# Patient Record
Sex: MALE | Race: WHITE | Employment: FULL TIME | URBAN - METROPOLITAN AREA
[De-identification: names, ages, dates, MRNs, and addresses within clinical notes are randomized per-mention and may not be internally consistent; named-entity substitution may affect disease eponyms.]

---

## 2020-07-30 ENCOUNTER — APPOINTMENT (OUTPATIENT)
Dept: CT IMAGING | Age: 45
End: 2020-07-30
Payer: COMMERCIAL

## 2020-07-30 ENCOUNTER — HOSPITAL ENCOUNTER (INPATIENT)
Age: 45
LOS: 5 days | Discharge: HOME | DRG: 054 | End: 2020-08-04
Attending: INTERNAL MEDICINE | Admitting: INTERNAL MEDICINE
Payer: COMMERCIAL

## 2020-07-30 ENCOUNTER — HOSPITAL ENCOUNTER (EMERGENCY)
Age: 45
Discharge: ANOTHER HOSPITAL | End: 2020-07-30
Attending: EMERGENCY MEDICINE
Payer: COMMERCIAL

## 2020-07-30 VITALS
TEMPERATURE: 98.3 F | BODY MASS INDEX: 28.6 KG/M2 | HEART RATE: 52 BPM | SYSTOLIC BLOOD PRESSURE: 145 MMHG | WEIGHT: 230 LBS | DIASTOLIC BLOOD PRESSURE: 92 MMHG | OXYGEN SATURATION: 96 % | RESPIRATION RATE: 19 BRPM | HEIGHT: 75 IN

## 2020-07-30 PROBLEM — G93.89 BRAIN MASS: Status: ACTIVE | Noted: 2020-07-30

## 2020-07-30 LAB
A/G RATIO: 1.6 (ref 1.1–2.2)
ALBUMIN SERPL-MCNC: 4.6 G/DL (ref 3.4–5)
ALP BLD-CCNC: 53 U/L (ref 40–129)
ALT SERPL-CCNC: 32 U/L (ref 10–40)
ANION GAP SERPL CALCULATED.3IONS-SCNC: 11 MMOL/L (ref 3–16)
APTT: 39.8 SEC (ref 24.2–36.2)
AST SERPL-CCNC: 24 U/L (ref 15–37)
BASOPHILS ABSOLUTE: 0.1 K/UL (ref 0–0.2)
BASOPHILS RELATIVE PERCENT: 0.9 %
BILIRUB SERPL-MCNC: 0.6 MG/DL (ref 0–1)
BUN BLDV-MCNC: 15 MG/DL (ref 7–20)
CALCIUM SERPL-MCNC: 9.8 MG/DL (ref 8.3–10.6)
CHLORIDE BLD-SCNC: 103 MMOL/L (ref 99–110)
CO2: 26 MMOL/L (ref 21–32)
CREAT SERPL-MCNC: 1.1 MG/DL (ref 0.9–1.3)
EKG ATRIAL RATE: 66 BPM
EKG DIAGNOSIS: NORMAL
EKG P AXIS: 26 DEGREES
EKG P-R INTERVAL: 138 MS
EKG Q-T INTERVAL: 422 MS
EKG QRS DURATION: 88 MS
EKG QTC CALCULATION (BAZETT): 442 MS
EKG R AXIS: 26 DEGREES
EKG T AXIS: 54 DEGREES
EKG VENTRICULAR RATE: 66 BPM
EOSINOPHILS ABSOLUTE: 0.2 K/UL (ref 0–0.6)
EOSINOPHILS RELATIVE PERCENT: 3.1 %
GFR AFRICAN AMERICAN: >60
GFR NON-AFRICAN AMERICAN: >60
GLOBULIN: 2.8 G/DL
GLUCOSE BLD-MCNC: 106 MG/DL (ref 70–99)
GLUCOSE BLD-MCNC: 110 MG/DL
GLUCOSE BLD-MCNC: 110 MG/DL (ref 70–99)
HCT VFR BLD CALC: 50.5 % (ref 40.5–52.5)
HEMOGLOBIN: 17.3 G/DL (ref 13.5–17.5)
INR BLD: 1.56 (ref 0.86–1.14)
LYMPHOCYTES ABSOLUTE: 1.8 K/UL (ref 1–5.1)
LYMPHOCYTES RELATIVE PERCENT: 26 %
MCH RBC QN AUTO: 30.1 PG (ref 26–34)
MCHC RBC AUTO-ENTMCNC: 34.3 G/DL (ref 31–36)
MCV RBC AUTO: 87.6 FL (ref 80–100)
MONOCYTES ABSOLUTE: 0.5 K/UL (ref 0–1.3)
MONOCYTES RELATIVE PERCENT: 6.5 %
NEUTROPHILS ABSOLUTE: 4.4 K/UL (ref 1.7–7.7)
NEUTROPHILS RELATIVE PERCENT: 63.5 %
PDW BLD-RTO: 12.2 % (ref 12.4–15.4)
PERFORMED ON: ABNORMAL
PLATELET # BLD: 216 K/UL (ref 135–450)
PMV BLD AUTO: 8.7 FL (ref 5–10.5)
POTASSIUM REFLEX MAGNESIUM: 4.2 MMOL/L (ref 3.5–5.1)
PRO-BNP: 151 PG/ML (ref 0–124)
PROTHROMBIN TIME: 18.2 SEC (ref 10–13.2)
RBC # BLD: 5.77 M/UL (ref 4.2–5.9)
SODIUM BLD-SCNC: 140 MMOL/L (ref 136–145)
TOTAL PROTEIN: 7.4 G/DL (ref 6.4–8.2)
TROPONIN: <0.01 NG/ML
TROPONIN: <0.01 NG/ML
TSH REFLEX: 2.75 UIU/ML (ref 0.27–4.2)
WBC # BLD: 6.9 K/UL (ref 4–11)

## 2020-07-30 PROCEDURE — 2060000000 HC ICU INTERMEDIATE R&B

## 2020-07-30 PROCEDURE — 84443 ASSAY THYROID STIM HORMONE: CPT

## 2020-07-30 PROCEDURE — 85025 COMPLETE CBC W/AUTO DIFF WBC: CPT

## 2020-07-30 PROCEDURE — 93010 ELECTROCARDIOGRAM REPORT: CPT | Performed by: INTERNAL MEDICINE

## 2020-07-30 PROCEDURE — U0003 INFECTIOUS AGENT DETECTION BY NUCLEIC ACID (DNA OR RNA); SEVERE ACUTE RESPIRATORY SYNDROME CORONAVIRUS 2 (SARS-COV-2) (CORONAVIRUS DISEASE [COVID-19]), AMPLIFIED PROBE TECHNIQUE, MAKING USE OF HIGH THROUGHPUT TECHNOLOGIES AS DESCRIBED BY CMS-2020-01-R: HCPCS

## 2020-07-30 PROCEDURE — 85610 PROTHROMBIN TIME: CPT

## 2020-07-30 PROCEDURE — 70450 CT HEAD/BRAIN W/O DYE: CPT

## 2020-07-30 PROCEDURE — 6360000004 HC RX CONTRAST MEDICATION: Performed by: EMERGENCY MEDICINE

## 2020-07-30 PROCEDURE — 93005 ELECTROCARDIOGRAM TRACING: CPT | Performed by: EMERGENCY MEDICINE

## 2020-07-30 PROCEDURE — 84484 ASSAY OF TROPONIN QUANT: CPT

## 2020-07-30 PROCEDURE — 85730 THROMBOPLASTIN TIME PARTIAL: CPT

## 2020-07-30 PROCEDURE — 80053 COMPREHEN METABOLIC PANEL: CPT

## 2020-07-30 PROCEDURE — 99285 EMERGENCY DEPT VISIT HI MDM: CPT

## 2020-07-30 PROCEDURE — 70498 CT ANGIOGRAPHY NECK: CPT

## 2020-07-30 PROCEDURE — 83880 ASSAY OF NATRIURETIC PEPTIDE: CPT

## 2020-07-30 PROCEDURE — 2580000003 HC RX 258: Performed by: EMERGENCY MEDICINE

## 2020-07-30 PROCEDURE — 71260 CT THORAX DX C+: CPT

## 2020-07-30 RX ORDER — 0.9 % SODIUM CHLORIDE 0.9 %
1000 INTRAVENOUS SOLUTION INTRAVENOUS ONCE
Status: COMPLETED | OUTPATIENT
Start: 2020-07-30 | End: 2020-07-30

## 2020-07-30 RX ORDER — SODIUM CHLORIDE 0.9 % (FLUSH) 0.9 %
10 SYRINGE (ML) INJECTION PRN
Status: DISCONTINUED | OUTPATIENT
Start: 2020-07-30 | End: 2020-08-04 | Stop reason: HOSPADM

## 2020-07-30 RX ORDER — ACETAMINOPHEN 325 MG/1
650 TABLET ORAL EVERY 6 HOURS PRN
Status: DISCONTINUED | OUTPATIENT
Start: 2020-07-30 | End: 2020-08-04 | Stop reason: HOSPADM

## 2020-07-30 RX ORDER — ACETAMINOPHEN 650 MG/1
650 SUPPOSITORY RECTAL EVERY 6 HOURS PRN
Status: DISCONTINUED | OUTPATIENT
Start: 2020-07-30 | End: 2020-08-04 | Stop reason: HOSPADM

## 2020-07-30 RX ORDER — SODIUM CHLORIDE 0.9 % (FLUSH) 0.9 %
10 SYRINGE (ML) INJECTION EVERY 12 HOURS SCHEDULED
Status: DISCONTINUED | OUTPATIENT
Start: 2020-07-30 | End: 2020-08-04 | Stop reason: HOSPADM

## 2020-07-30 RX ORDER — ONDANSETRON 2 MG/ML
4 INJECTION INTRAMUSCULAR; INTRAVENOUS EVERY 6 HOURS PRN
Status: DISCONTINUED | OUTPATIENT
Start: 2020-07-30 | End: 2020-08-04 | Stop reason: HOSPADM

## 2020-07-30 RX ORDER — PROMETHAZINE HYDROCHLORIDE 25 MG/1
12.5 TABLET ORAL EVERY 6 HOURS PRN
Status: DISCONTINUED | OUTPATIENT
Start: 2020-07-30 | End: 2020-08-04 | Stop reason: HOSPADM

## 2020-07-30 RX ORDER — POLYETHYLENE GLYCOL 3350 17 G/17G
17 POWDER, FOR SOLUTION ORAL DAILY PRN
Status: DISCONTINUED | OUTPATIENT
Start: 2020-07-30 | End: 2020-08-04 | Stop reason: HOSPADM

## 2020-07-30 RX ADMIN — SODIUM CHLORIDE 1000 ML: 9 INJECTION, SOLUTION INTRAVENOUS at 16:30

## 2020-07-30 RX ADMIN — SODIUM CHLORIDE 1000 ML: 9 INJECTION, SOLUTION INTRAVENOUS at 12:12

## 2020-07-30 RX ADMIN — IOPAMIDOL 75 ML: 755 INJECTION, SOLUTION INTRAVENOUS at 15:51

## 2020-07-30 RX ADMIN — IOPAMIDOL 85 ML: 755 INJECTION, SOLUTION INTRAVENOUS at 11:38

## 2020-07-30 NOTE — PROGRESS NOTES
I was paged about this patient for a potential transfer to [facility]. I performed a chart review, reviewed the CT head that shows a 3.6 cm cystic mass in the right frontal lobe that is heterogenous, not clear what this could be. Patient clearly symptomatic from this. I spoke to the ER physician Dr. Susie Springer and recommended she speak to neurosurgery to determine if this patient needs to be admitted to [facility] or [facility] depending on any potential neurosurgical intervention that may be needed. She recommended patient can be admitted to [facility], get an MRI brain and consult neurosurgery here, as the likelihood of intervention overnight is low. Although later after much deliberation, she did offer to speak to neurosurgery. However, hospital to hospital patient transfer once admitted may not be in the best interest of the patient in case he needed any intervention, and this opens the possibility for delay of care. Hence I volunteered to speak to the neurosurgeon on call myself. Spoke to Dr. Simba Gilliland, who reviewed the patient's chart, imaging and recommended that the patient will be best served in [facility]. Spoke to transfer center and current ED physician Dr. Mirela Hunt and informed the two about the patient needing transfer to [facility], which they agreed to arrange.     , Hospitalist  [facility]

## 2020-07-30 NOTE — ED PROVIDER NOTES
[address] ED      CHIEF COMPLAINT  Extremity Weakness (Yesterday at work experienced a \"head rush\" and bilateral extremity weakness. This moring feels fatigued and bilateral legs feel heavy. )       HISTORY OF PRESENT ILLNESS  Meche Cunningham is a 53 y.o. male  with h/o sarcoidosis who presents to the ED for evaluation of leg heaviness and dizziness. Patient reports he was at work around [address] yesterday when he started having dizziness. He describes the dizziness as the sensation of feeling like he is going to pass out. Denies associated chest pain. Patient reports having history of multiple PEs. He had IVC filter placed at age 25 and had PE after IVC filter placement. Also reports having developed PE on warfarin in December and changed to rivaroxaban. Reports this feels very similar to when he got diagnosed with his last PE. Reports he often develops this dizziness when he gets up. Patient says his coworkers thought he appeared pale and appeared \"dazed. \"  Patient reports her coworker drove him home last night. Patient is visiting from [place] for a business trip. Says he has to drive back tomorrow. He did not feel well enough to drive back tomorrow which prompted the visit to the emergency department. Yesterday 4pm both legs felt heavy while walking  [place] dizzy when he got up   Appeared dazed   Got him a snickers bar yesterday. Coworker drove him home. Really tired for 2 weeks. IVC filter placed at age 25 for PE. Reports having twitching movement of his arm for about 10 min. No other complaints, modifying factors or associated symptoms. I have reviewed the following from the nursing documentation. Past Medical History:   Diagnosis Date    Pulmonary embolism ([address]     Sarcoidosis      History reviewed. No pertinent surgical history. History reviewed. No pertinent family history.   Social History     Socioeconomic History    Marital status: Unknown     Spouse name: Kosta Sweet MD        acetaminophen (TYLENOL) tablet 650 mg  650 mg Oral Q6H PRN Riley Rodriguez MD   650 mg at 07/31/20 9116    Or    acetaminophen (TYLENOL) suppository 650 mg  650 mg Rectal Q6H PRN Riley Rodriguez MD        polyethylene glycol [facility]) packet 17 g  17 g Oral Daily PRN Riley Rodriguez MD        promethazine (PHENERGAN) tablet 12.5 mg  12.5 mg Oral Q6H PRN Riley Rodriguez MD        Or    ondansetron [facility]) injection 4 mg  4 mg Intravenous Q6H PRN Riley Rodriguez MD        enoxaparin (LOVENOX) injection 40 mg  40 mg Subcutaneous Daily Riley Rodriguez MD   40 mg at 07/31/20 9084     Allergies   Allergen Reactions    Clindamycin/Lincomycin     Pcn [Penicillins]        REVIEW OF SYSTEMS  10 systems reviewed, pertinent positives per HPI otherwise noted to be negative. PHYSICAL EXAM  BP (!) 145/92   Pulse 52   Temp 98.3 °F (36.8 °C) (Oral)   Resp 19   Ht 6' 3\" (1.905 m)   Wt 230 lb (104.3 kg)   SpO2 96%   BMI 28.75 kg/m²    GENERAL APPEARANCE: Awake and alert. Cooperative. No acute distress. HENT: Normocephalic. Atraumatic. CN  2-12 grossly intact. HEART/CHEST: RRR. No murmurs appreciated  LUNGS: Respirations unlabored. Speaking comfortably in full sentences. CTAB. ABDOMEN: Soft, non-distended abdomen. Non tender to palpation. No guarding. No rebound. EXTREMITIES: No gross deformities. Moving all extremities. All extremities neurovascularly intact. SKIN: Warm and dry. No acute rashes. NEUROLOGICAL: Alert and oriented. CN's 2-12 intact. No gross facial drooping. Strength 5/5, sensation intact. PSYCHIATRIC: Normal mood and affect.     LABS  Results for orders placed or performed during the hospital encounter of 07/30/20   CBC Auto Differential   Result Value Ref Range    WBC 6.9 4.0 - 11.0 K/uL    RBC 5.77 4.20 - 5.90 M/uL    Hemoglobin 17.3 13.5 - 17.5 g/dL    Hematocrit 50.5 40.5 - 52.5 %    MCV 87.6 80.0 - 100.0 fL    MCH 30.1 26.0 - 34.0 pg    MCHC 34.3 31.0 - 36.0 g/dL RDW 12.2 (L) 12.4 - 15.4 %    Platelets 331 804 - 542 K/uL    MPV 8.7 5.0 - 10.5 fL    Neutrophils % 63.5 %    Lymphocytes % 26.0 %    Monocytes % 6.5 %    Eosinophils % 3.1 %    Basophils % 0.9 %    Neutrophils Absolute 4.4 1.7 - 7.7 K/uL    Lymphocytes Absolute 1.8 1.0 - 5.1 K/uL    Monocytes Absolute 0.5 0.0 - 1.3 K/uL    Eosinophils Absolute 0.2 0.0 - 0.6 K/uL    Basophils Absolute 0.1 0.0 - 0.2 K/uL   Comprehensive Metabolic Panel w/ Reflex to MG   Result Value Ref Range    Sodium 140 136 - 145 mmol/L    Potassium reflex Magnesium 4.2 3.5 - 5.1 mmol/L    Chloride 103 99 - 110 mmol/L    CO2 26 21 - 32 mmol/L    Anion Gap 11 3 - 16    Glucose 106 (H) 70 - 99 mg/dL    BUN 15 7 - 20 mg/dL    CREATININE 1.1 0.9 - 1.3 mg/dL    GFR Non-African American >60 >60    GFR African American >60 >60    Calcium 9.8 8.3 - 10.6 mg/dL    Total Protein 7.4 6.4 - 8.2 g/dL    Alb 4.6 3.4 - 5.0 g/dL    Albumin/Globulin Ratio 1.6 1.1 - 2.2    Total Bilirubin 0.6 0.0 - 1.0 mg/dL    Alkaline Phosphatase 53 40 - 129 U/L    ALT 32 10 - 40 U/L    AST 24 15 - 37 U/L    Globulin 2.8 g/dL   TSH with Reflex   Result Value Ref Range    TSH 2.75 0.27 - 4.20 uIU/mL   APTT   Result Value Ref Range    aPTT 39.8 (H) 24.2 - 36.2 sec   Protime-INR   Result Value Ref Range    Protime 18.2 (H) 10.0 - 13.2 sec    INR 1.56 (H) 0.86 - 1.14   Brain Natriuretic Peptide   Result Value Ref Range    Pro- (H) 0 - 124 pg/mL   Troponin   Result Value Ref Range    Troponin <0.01 <0.01 ng/mL   Troponin   Result Value Ref Range    Troponin <0.01 <0.01 ng/mL   POCT Glucose   Result Value Ref Range    Glucose 110 mg/dL   POCT Glucose   Result Value Ref Range    POC Glucose 110 (H) 70 - 99 mg/dl    Performed on [employer]    EKG 12 Lead   Result Value Ref Range    Ventricular Rate 66 BPM    Atrial Rate 66 BPM    P-R Interval 138 ms    QRS Duration 88 ms    Q-T Interval 422 ms    QTc Calculation (Bazett) 442 ms    P Axis 26 degrees    R Axis 26 degrees    T Axis 54 degrees    Diagnosis       Normal sinus rhythmNonspecific ST abnormalityAbnormal ECGNo previous ECGs availableConfirmed by CJ HERNANDEZ MD ([id number]) on 7/30/2020 11:54:15 AM       I have reviewed all labs for this visit. ECG  The Ekg interpreted by me shows  Normal sinus rhythm with a rate of 66  Axis is normal  QTc is normal  Intervals and Durations are unremarkable. ST Segments: No ST elevation    RADIOLOGY  Ct Chest Pulmonary Embolism W Contrast    Result Date: 7/30/2020  EXAMINATION: CTA OF THE CHEST 7/30/2020 11:37 am TECHNIQUE: CTA of the chest was performed after the administration of intravenous contrast.  Multiplanar reformatted images are provided for review. MIP images are provided for review. Dose modulation, iterative reconstruction, and/or weight based adjustment of the mA/kV was utilized to reduce the radiation dose to as low as reasonably achievable. COMPARISON: None. HISTORY: ORDERING SYSTEM PROVIDED HISTORY: dizziness TECHNOLOGIST PROVIDED HISTORY: Reason for exam:->dizziness Reason for Exam: Yesterday at work experienced a \"head rush\" and bilateral extremity weakness. This moring feels fatigued and bilateral legs feel heavy. ) Acuity: Acute Type of Exam: Initial Additional signs and symptoms: dizzy Relevant Medical/Surgical History: PE, blood thinners FINDINGS: Pulmonary Arteries: Pulmonary arteries are adequately opacified for evaluation. No evidence of intraluminal filling defect to suggest pulmonary embolism. Main pulmonary artery is normal in caliber. Mediastinum: No evidence of mediastinal lymphadenopathy. The heart and pericardium demonstrate no acute abnormality. There is no acute abnormality of the thoracic aorta. Lungs/pleura: There is mild bibasilar dependent atelectasis. No focal consolidation or pulmonary edema. No evidence of pleural effusion or pneumothorax. Upper Abdomen: Limited images of the upper abdomen are unremarkable.  Incidental note is made of an IVC

## 2020-07-31 ENCOUNTER — APPOINTMENT (OUTPATIENT)
Dept: CT IMAGING | Age: 45
DRG: 054 | End: 2020-07-31
Attending: INTERNAL MEDICINE
Payer: COMMERCIAL

## 2020-07-31 ENCOUNTER — APPOINTMENT (OUTPATIENT)
Dept: MRI IMAGING | Age: 45
DRG: 054 | End: 2020-07-31
Attending: INTERNAL MEDICINE
Payer: COMMERCIAL

## 2020-07-31 LAB
ANION GAP SERPL CALCULATED.3IONS-SCNC: 10 MMOL/L (ref 3–16)
BASOPHILS ABSOLUTE: 0 K/UL (ref 0–0.2)
BASOPHILS RELATIVE PERCENT: 0.5 %
BUN BLDV-MCNC: 13 MG/DL (ref 7–20)
CALCIUM SERPL-MCNC: 9.2 MG/DL (ref 8.3–10.6)
CHLORIDE BLD-SCNC: 108 MMOL/L (ref 99–110)
CO2: 22 MMOL/L (ref 21–32)
CREAT SERPL-MCNC: 1.1 MG/DL (ref 0.9–1.3)
EOSINOPHILS ABSOLUTE: 0.2 K/UL (ref 0–0.6)
EOSINOPHILS RELATIVE PERCENT: 3.4 %
GFR AFRICAN AMERICAN: >60
GFR NON-AFRICAN AMERICAN: >60
GLUCOSE BLD-MCNC: 97 MG/DL (ref 70–99)
HCT VFR BLD CALC: 47.2 % (ref 40.5–52.5)
HEMOGLOBIN: 16.7 G/DL (ref 13.5–17.5)
INR BLD: 1.14 (ref 0.86–1.14)
LYMPHOCYTES ABSOLUTE: 1.6 K/UL (ref 1–5.1)
LYMPHOCYTES RELATIVE PERCENT: 22.5 %
MCH RBC QN AUTO: 31.5 PG (ref 26–34)
MCHC RBC AUTO-ENTMCNC: 35.4 G/DL (ref 31–36)
MCV RBC AUTO: 89.2 FL (ref 80–100)
MONOCYTES ABSOLUTE: 0.7 K/UL (ref 0–1.3)
MONOCYTES RELATIVE PERCENT: 10.1 %
NEUTROPHILS ABSOLUTE: 4.5 K/UL (ref 1.7–7.7)
NEUTROPHILS RELATIVE PERCENT: 63.5 %
PDW BLD-RTO: 12.3 % (ref 12.4–15.4)
PLATELET # BLD: 196 K/UL (ref 135–450)
PMV BLD AUTO: 9 FL (ref 5–10.5)
POTASSIUM REFLEX MAGNESIUM: 4 MMOL/L (ref 3.5–5.1)
PROTHROMBIN TIME: 13.3 SEC (ref 10–13.2)
RBC # BLD: 5.29 M/UL (ref 4.2–5.9)
SODIUM BLD-SCNC: 140 MMOL/L (ref 136–145)
WBC # BLD: 7.1 K/UL (ref 4–11)

## 2020-07-31 PROCEDURE — 2060000000 HC ICU INTERMEDIATE R&B

## 2020-07-31 PROCEDURE — 6360000004 HC RX CONTRAST MEDICATION: Performed by: NEUROLOGICAL SURGERY

## 2020-07-31 PROCEDURE — 2580000003 HC RX 258: Performed by: INTERNAL MEDICINE

## 2020-07-31 PROCEDURE — A9576 INJ PROHANCE MULTIPACK: HCPCS | Performed by: NEUROLOGICAL SURGERY

## 2020-07-31 PROCEDURE — 71260 CT THORAX DX C+: CPT

## 2020-07-31 PROCEDURE — 85025 COMPLETE CBC W/AUTO DIFF WBC: CPT

## 2020-07-31 PROCEDURE — 70553 MRI BRAIN STEM W/O & W/DYE: CPT

## 2020-07-31 PROCEDURE — 6360000002 HC RX W HCPCS: Performed by: INTERNAL MEDICINE

## 2020-07-31 PROCEDURE — 80048 BASIC METABOLIC PNL TOTAL CA: CPT

## 2020-07-31 PROCEDURE — 6370000000 HC RX 637 (ALT 250 FOR IP): Performed by: INTERNAL MEDICINE

## 2020-07-31 PROCEDURE — 6370000000 HC RX 637 (ALT 250 FOR IP): Performed by: NURSE PRACTITIONER

## 2020-07-31 PROCEDURE — 6360000004 HC RX CONTRAST MEDICATION: Performed by: NURSE PRACTITIONER

## 2020-07-31 PROCEDURE — 85610 PROTHROMBIN TIME: CPT

## 2020-07-31 PROCEDURE — 6360000002 HC RX W HCPCS: Performed by: NURSE PRACTITIONER

## 2020-07-31 RX ORDER — DEXAMETHASONE SODIUM PHOSPHATE 4 MG/ML
4 INJECTION, SOLUTION INTRA-ARTICULAR; INTRALESIONAL; INTRAMUSCULAR; INTRAVENOUS; SOFT TISSUE EVERY 6 HOURS
Status: DISCONTINUED | OUTPATIENT
Start: 2020-07-31 | End: 2020-08-04 | Stop reason: HOSPADM

## 2020-07-31 RX ORDER — PANTOPRAZOLE SODIUM 40 MG/1
40 TABLET, DELAYED RELEASE ORAL
Status: DISCONTINUED | OUTPATIENT
Start: 2020-08-01 | End: 2020-08-04 | Stop reason: HOSPADM

## 2020-07-31 RX ORDER — LEVETIRACETAM 500 MG/1
500 TABLET ORAL 2 TIMES DAILY
Status: DISCONTINUED | OUTPATIENT
Start: 2020-07-31 | End: 2020-08-04 | Stop reason: HOSPADM

## 2020-07-31 RX ORDER — DEXAMETHASONE SODIUM PHOSPHATE 4 MG/ML
10 INJECTION, SOLUTION INTRA-ARTICULAR; INTRALESIONAL; INTRAMUSCULAR; INTRAVENOUS; SOFT TISSUE ONCE
Status: COMPLETED | OUTPATIENT
Start: 2020-07-31 | End: 2020-07-31

## 2020-07-31 RX ADMIN — DEXAMETHASONE SODIUM PHOSPHATE 4 MG: 4 INJECTION, SOLUTION INTRAMUSCULAR; INTRAVENOUS at 20:38

## 2020-07-31 RX ADMIN — DEXAMETHASONE SODIUM PHOSPHATE 4 MG: 4 INJECTION, SOLUTION INTRAMUSCULAR; INTRAVENOUS at 14:14

## 2020-07-31 RX ADMIN — Medication 10 ML: at 20:38

## 2020-07-31 RX ADMIN — LEVETIRACETAM 500 MG: 500 TABLET, FILM COATED ORAL at 20:38

## 2020-07-31 RX ADMIN — Medication 10 ML: at 08:26

## 2020-07-31 RX ADMIN — DEXAMETHASONE SODIUM PHOSPHATE 10 MG: 4 INJECTION, SOLUTION INTRAMUSCULAR; INTRAVENOUS at 08:28

## 2020-07-31 RX ADMIN — IOPAMIDOL 80 ML: 755 INJECTION, SOLUTION INTRAVENOUS at 16:19

## 2020-07-31 RX ADMIN — ACETAMINOPHEN 650 MG: 325 TABLET ORAL at 20:38

## 2020-07-31 RX ADMIN — ACETAMINOPHEN 650 MG: 325 TABLET ORAL at 00:10

## 2020-07-31 RX ADMIN — Medication 10 ML: at 00:10

## 2020-07-31 RX ADMIN — ACETAMINOPHEN 650 MG: 325 TABLET ORAL at 08:28

## 2020-07-31 RX ADMIN — ACETAMINOPHEN 650 MG: 325 TABLET ORAL at 16:43

## 2020-07-31 RX ADMIN — LEVETIRACETAM 500 MG: 500 TABLET, FILM COATED ORAL at 08:26

## 2020-07-31 RX ADMIN — ENOXAPARIN SODIUM 40 MG: 40 INJECTION SUBCUTANEOUS at 08:33

## 2020-07-31 RX ADMIN — GADOTERIDOL 20 ML: 279.3 INJECTION, SOLUTION INTRAVENOUS at 19:21

## 2020-07-31 RX ADMIN — IOHEXOL 50 ML: 240 INJECTION, SOLUTION INTRATHECAL; INTRAVASCULAR; INTRAVENOUS; ORAL at 16:19

## 2020-07-31 ASSESSMENT — PAIN DESCRIPTION - PROGRESSION: CLINICAL_PROGRESSION: GRADUALLY IMPROVING

## 2020-07-31 ASSESSMENT — PAIN SCALES - GENERAL
PAINLEVEL_OUTOF10: 3
PAINLEVEL_OUTOF10: 4
PAINLEVEL_OUTOF10: 1
PAINLEVEL_OUTOF10: 3
PAINLEVEL_OUTOF10: 2
PAINLEVEL_OUTOF10: 2
PAINLEVEL_OUTOF10: 4
PAINLEVEL_OUTOF10: 4

## 2020-07-31 ASSESSMENT — PAIN DESCRIPTION - PAIN TYPE
TYPE: ACUTE PAIN

## 2020-07-31 ASSESSMENT — PAIN DESCRIPTION - LOCATION
LOCATION: HEAD

## 2020-07-31 ASSESSMENT — PAIN DESCRIPTION - ORIENTATION: ORIENTATION: MID

## 2020-07-31 ASSESSMENT — PAIN DESCRIPTION - FREQUENCY
FREQUENCY: CONTINUOUS
FREQUENCY: CONTINUOUS

## 2020-07-31 ASSESSMENT — PAIN DESCRIPTION - ONSET
ONSET: ON-GOING
ONSET: AWAKENED FROM SLEEP

## 2020-07-31 ASSESSMENT — PAIN DESCRIPTION - DESCRIPTORS
DESCRIPTORS: ACHING;HEADACHE
DESCRIPTORS: HEADACHE

## 2020-07-31 ASSESSMENT — PAIN - FUNCTIONAL ASSESSMENT: PAIN_FUNCTIONAL_ASSESSMENT: ACTIVITIES ARE NOT PREVENTED

## 2020-07-31 NOTE — PROGRESS NOTES
Patient Melida Fang, called and updated on patient status and plan of care.  Electronically signed by Keshawn Mitchell RN on 7/31/2020 at 7:52 PM

## 2020-07-31 NOTE — CONSULTS
NEUROSURGERY CONSULT  Silas Concepcion  [id number]   1975 7/31/2020    Requesting physician: Mateo Holly MD    Reason for consultation: brain mass    History of present illness: Due to limiting exposure to COVID-19, initial encounter was completed by using EMR and from conversations with medical team involved in case. Patient was not interviewed or examined, at this time. I have personally reviewed the reports and images of labs, radiological studies, current and old medical records    Patient is a 53 y.o. male w/ PMH idiopathic pulmonary embolism, DVTs and sarcoidosis who presented on 7/31/2020 with dizziness and feeling like his legs were heavy at work. The patient lives in [place] and is here temporarily on a job, he began feeling dizzy two days ago and he boss drove him home. He went to work again yesterday and coworkers noticed he did not look well, and the patient felt like he was unable to drive back home so he was brought to the ED for evaluation. Patient reported in the ED feeling like the room was spinning and he was going to pass out, was given a liter bolus with very temporary improvement. Initial head CT revealed Heterogeneous primarily cystic mass in the right frontal lobe medially. The patient does have a history of DVT and PE. From previous medical records in [place] he had an IVC filter placed that was eventually displaced by an embolus after failed Coumadin therapy (INR was 3 at the time), and had bilateral moderate pulmonary emboli in August 2019. During that hospitlization a head CT was performed due to headache on Coumadin which revealed a curvilinear sulcal hyperdensity in the inferior left occipital region which he was supposed to have follow up MRI imaging in 6 months.  He had a hernando filter placed at that time and was taking Michi María has been held since admission to the hospital.     ROS:   MARIAH 2/2 patient in covid-19 rule out isolation    Allergies   Allergen Reactions    Clindamycin/Lincomycin     Pcn [Penicillins]        Past Medical History:   Diagnosis Date    Pulmonary embolism ([address]     Sarcoidosis         No past surgical history on file. Social History     Occupational History    Not on file   Tobacco Use    Smoking status: Never Smoker    Smokeless tobacco: Never Used   Substance and Sexual Activity    Alcohol use: Yes     Comment: occas    Drug use: Not Currently    Sexual activity: Not on file        No family history on file. Outpatient Medications Marked as Taking for the 7/30/20 encounter [facility] Encounter)   Medication Sig Dispense Refill    rivaroxaban (XARELTO) 20 MG TABS tablet Take 20 mg by mouth        Current Facility-Administered Medications   Medication Dose Route Frequency Provider Last Rate Last Dose    sodium chloride flush 0.9 % injection 10 mL  10 mL Intravenous 2 times per day Lázaro Valdez MD   10 mL at 07/31/20 0010    sodium chloride flush 0.9 % injection 10 mL  10 mL Intravenous PRN Lázaro Valdez MD        acetaminophen (TYLENOL) tablet 650 mg  650 mg Oral Q6H PRN Lázaro Valdez MD   650 mg at 07/31/20 0010    Or    acetaminophen (TYLENOL) suppository 650 mg  650 mg Rectal Q6H PRN Lázaro Valdez MD        polyethylene glycol [facility]) packet 17 g  17 g Oral Daily PRN Lázaro Valdez MD        promethazine (PHENERGAN) tablet 12.5 mg  12.5 mg Oral Q6H PRN Lázaro Valdez MD        Or    [facility]ron [facility]) injection 4 mg  4 mg Intravenous Q6H PRN Lázaro Valdez MD        enoxaparin (LOVENOX) injection 40 mg  40 mg Subcutaneous Daily Lázaro Valdez MD          Objective:  /74   Pulse 64   Temp 97.4 °F (36.3 °C) (Oral)   Resp 16   Ht 6' 3\" (1.905 m)   Wt 230 lb 6.1 oz (104.5 kg)   SpO2 94%   BMI 28.80 kg/m²     Physical Exam:  Patient was seen during Covid-19 pandemic and all efforts made to respect recommendations of social distancing and decrease PPE have been made.   Unable to perform face to face examination of the patient. Radiological Findings:  CT head: Heterogeneous primarily cystic mass in the right frontal lobe medially and correlation with pre and post-contrast MRI is recommended for further characterization       CTA head/neck: Negative for large vessel occlusion or hemodynamic stenosis. Right frontal intraparenchymal mass again demonstrated, which appears to possibly extend across the midline minutes centrally necrotic or cystic component. Recommend further characterization with MRI performed without and with contrast.  Please note that a high-grade glioma would be considered in differential..     Labs  Recent Labs     07/30/20  1025 07/30/20  1051 07/31/20  0520     --  140     --  108   CO2 26  --  22   BUN 15  --  13   CREATININE 1.1  --  1.1   GLUCOSE 106* 110 97     Recent Labs     07/30/20  1025 07/31/20  0520   WBC 6.9 7.1   RBC 5.77 5.29   INR 1.56* 1.14       Patient Active Problem List    Diagnosis Date Noted    Brain mass 07/30/2020       Assessment:  53year old male experiencing dizziness and leg heaviness, CT of head on admission revealed Heterogeneous primarily cystic mass in the right frontal lobe. Need MRI for surgical planning, likely to OR next week for resection with Dr. Rosa Pacheco.    Plan:        1. No emergent neurosurgical intervention indicated, will likely plan for OR next week        2. Neurologic exams frequency: Q 4 hours   3. For change in exam MUST contact neurosurgery team along with critical care or primary team  4. MRI with brain lab sequence  5. CT chest/abdomen/pelvis  6. Hold Xarelto  7. SCDs, okay for SQ Lovenox for DVT prophylaxis  8. Seizure prophylaxis: Keppra 500 mg BID  9. Steroids: Decadron IV 10mg X 1 dose, 4mg q 6 hours to follow  10. GI Prophylaxis: Protonix  11. Advance diet / activity per primary team  12.  Hem/onc consult for history of recurring DVT/ PE, need for surgery next week- was taking Xarelto before admission   13. Thanks for consult. Please call w/ questions or decline in mental status     DISPO- inpatient   [phone]    Patient was discussed with Dr. Pranay Contreras who agrees with above assessment and plan.      Electronically signed, 7/31/2020 7:55 AM

## 2020-07-31 NOTE — PROGRESS NOTES
Hospitalist Progress Note      PCP: No primary care provider on file. Chief Complaint. Presented to hospital for right frontal lobe brain mass    Date of Admission: 7/30/2020      Subjective:   denies chest pain, nausea, vomiting, shortness of breath, fever or chills. mention feels overall better    Medications:  Reviewed    Infusion Medications   Scheduled Medications    dexamethasone  4 mg Intravenous Q6H    levETIRAcetam  500 mg Oral BID    [START ON 8/1/2020] pantoprazole  40 mg Oral QAM AC    sodium chloride flush  10 mL Intravenous 2 times per day    enoxaparin  40 mg Subcutaneous Daily     PRN Meds: sodium chloride flush, acetaminophen **OR** acetaminophen, polyethylene glycol, promethazine **OR** ondansetron      Intake/Output Summary (Last 24 hours) at 7/31/2020 1236  Last data filed at 7/31/2020 1133  Gross per 24 hour   Intake 360 ml   Output 0 ml   Net 360 ml       Physical Exam Performed:    /80   Pulse 68   Temp 97 °F (36.1 °C) (Oral)   Resp 18   Ht 6' 3\" (1.905 m)   Wt 230 lb 6.1 oz (104.5 kg)   SpO2 94%   BMI 28.80 kg/m²     General appearance: No apparent distress,   HEENT:  Conjunctivae/corneas clear. Neck: Supple, with full range of motion. Respiratory:  Normal respiratory effort. Clear to auscultation, bilaterally without Rales/Wheezes/Rhonchi. Cardiovascular: Regular rate and rhythm with normal S1/S2 without murmurs or rubs  Abdomen: Soft, non-tender, non-distended, normal bowel sounds. Musculoskeletal: No cyanosis or edema bilaterally  Neurologic:  without any focal sensory/motor deficits.  grossly non-focal.  Psychiatric: Alert and oriented, Normal mood  Peripheral Pulses: +2 palpable, equal bilaterally       Labs:   Recent Labs     07/30/20  1025 07/31/20  0520   WBC 6.9 7.1   HGB 17.3 16.7   HCT 50.5 47.2    196     Recent Labs     07/30/20  1025 07/31/20  0520    140   K 4.2 4.0    108   CO2 26 22   BUN 15 13   CREATININE 1.1 1.1   CALCIUM

## 2020-07-31 NOTE — CARE COORDINATION
Case Management Assessment           Initial Evaluation                Date / Time of Evaluation: 7/31/2020 11:54 AM                 Assessment Completed by: Suzy Calvillo    Patient Name: Pastora Wiggins     YOB: 1975  Diagnosis: Brain mass [G93.89]     Date / Time: 7/30/2020 10:15 PM    Patient Admission Status: Inpatient    If patient is discharged prior to next notation, then this note serves as note for discharge by case management. Current PCP: No primary care provider on file. Clinic Patient: No    Chart Reviewed: Yes  Patient/ Family Interviewed: Yes    Initial assessment completed at bedside with: patient via phone due to [address] precautions    Hospitalization in the last 30 days: No    Emergency Contacts:  Extended Emergency Contact Information  Primary Emergency Contact: none,none  Home Phone: [phone]  Relation: Other  Secondary Emergency Contact: Riki Gibson  Mobile Phone: [phone]  Relation: Spouse    Advance Directives:   Code Status: Full [address]: No  Agent:   Contact Number:     Copy present: No     In paper Chart: No    Scanned into EMR No    Financial  Payor: [facility] / Plan: [address] [place] [address] / Product Type: *No Product type* /     Pre-cert required for SNF: Yes    Pharmacy  No Pharmacies Listed    Potential assistance Purchasing Medications: Potential Assistance Purchasing Medications: No  Does Patient want to participate in local refill/ meds to beds program?: No    Meds To Beds General Rules:  1. Can ONLY be done Monday- Friday between 8:30am-5pm  2. Prescription(s) must be in pharmacy by 3pm to be filled same day  3. Copy of patient's insurance/ prescription drug card and patient face sheet must be sent along with the prescription(s)  4. Cost of Rx cannot be added to hospital bill. If financial assistance is needed, please contact unit  or ;   or  CANNOT provide pharmacy voucher for patients co-pays  5.  Patients can then  the prescription on their way out of the hospital at discharge, or pharmacy can deliver to the bedside if staff is available. (payment due at time of pick-up or delivery - cash, check, or card accepted)     Able to afford home medications/ co-pay costs: Yes    ADLS  Support Systems: Spouse/Significant Other    PT AM-PAC:   /24  OT AM-PAC:   /24    [place]: from home alone, sometimes stays with girl friend, CURRENTLY here in [place] working  Steps: 1 at his home and 7-8 at his girl friends home in [address] to [address] [facility] to current housing: Yes  Barriers to RETURNING to current housing: none per patient    [address]  Currently ACTIVE with [address]: No  Home Care Agency: Not Applicable    Currently ACTIVE with [place] on Aging: No  Passport/ Waiver: No  Passport/ Waiver Services: Not Applicable    :  Phone:       [address]  [facility] Provider: none per patient  Equipment:     Home Oxygen and [address] [place] prior to admission: No  [address]: Not Applicable  Other Respiratory Equipment:     Informed of need to bring portable home O2 tank on day of DISCHARGE for nursing to connect prior to leaving: No  Verbalized agreement/Understanding: No  Person to bring portable tank at discharge:     Dialysis  Active with HD/PD prior to admission: No  Nephrologist:     HD Center:  Not Applicable    DISCHARGE PLAN:  Disposition: Home- No Services Needed    Transportation PLAN for discharge: family - girl friend to transport    Factors facilitating achievement of predicted outcomes: Family support, Friend support, Cooperative and Pleasant    Barriers to discharge: Stairs at home, Medication managment and possible surgical intervention    Additional Case Management Notes: CM spoke with patient via phone due to [address] precautions in place, patient here temporarily in [place] for work, patient lives in [place], he reports he lives at home alone and sometimes with his girl friend, he currently declines using DME or having any HHC needs at this time. Patient reports that his girl friend will be the one to drive him home at discharge. Patient currently undergoing neurosurgery work up and has CT and MRI pending as well as an Oncology consult. The Plan for Transition of Care is related to the following treatment goals of Brain mass [G93.89]    The Patient and/or patient representative Neha Salinas and his family were provided with a choice of provider and agrees with the discharge plan Yes    Freedom of choice list was provided with basic dialogue that supports the patient's individualized plan of care/goals and shares the quality data associated with the providers.  Yes    Care Transition patient: No    Jose G Villafana RN  [facility]  Case Management Department  Ph: [phone]   Fax: [phone]

## 2020-07-31 NOTE — ED PROVIDER NOTES
4:33 PM: I discussed the history, physical examination, laboratory and imaging studies, and treatment plan with Dr. Rosaura Carlson. Mikael Hefty was signed out to me in stable condition. Please see Dr. Fran Martinez documentation for details of their history, physical, and laboratory studies. Upon re-examination, a summary of Diana Phillip history, physical examination, and studies are as follows: Patient presenting for evaluation of dizziness and feeling of heaviness in his extremities. At time of signout, imaging showing a cystic structure intracranially and consult pending for possible transfer to [facility] versus [facility].    On exam, patient is awake and alert with GCS 15. Is moving all 4 extremities without gross focal deficit. No truncal ataxia. No facial droop. No slurred speech.     Results for orders placed or performed during the hospital encounter of 07/30/20   CBC Auto Differential   Result Value Ref Range    WBC 6.9 4.0 - 11.0 K/uL    RBC 5.77 4.20 - 5.90 M/uL    Hemoglobin 17.3 13.5 - 17.5 g/dL    Hematocrit 50.5 40.5 - 52.5 %    MCV 87.6 80.0 - 100.0 fL    MCH 30.1 26.0 - 34.0 pg    MCHC 34.3 31.0 - 36.0 g/dL    RDW 12.2 (L) 12.4 - 15.4 %    Platelets 841 747 - 683 K/uL    MPV 8.7 5.0 - 10.5 fL    Neutrophils % 63.5 %    Lymphocytes % 26.0 %    Monocytes % 6.5 %    Eosinophils % 3.1 %    Basophils % 0.9 %    Neutrophils Absolute 4.4 1.7 - 7.7 K/uL    Lymphocytes Absolute 1.8 1.0 - 5.1 K/uL    Monocytes Absolute 0.5 0.0 - 1.3 K/uL    Eosinophils Absolute 0.2 0.0 - 0.6 K/uL    Basophils Absolute 0.1 0.0 - 0.2 K/uL   Comprehensive Metabolic Panel w/ Reflex to MG   Result Value Ref Range    Sodium 140 136 - 145 mmol/L    Potassium reflex Magnesium 4.2 3.5 - 5.1 mmol/L    Chloride 103 99 - 110 mmol/L    CO2 26 21 - 32 mmol/L    Anion Gap 11 3 - 16    Glucose 106 (H) 70 - 99 mg/dL    BUN 15 7 - 20 mg/dL    CREATININE 1.1 0.9 - 1.3 mg/dL    GFR Non-African American >60 >60    GFR  American >60 >60    Calcium 9.8 8.3 - 10.6 mg/dL    Total Protein 7.4 6.4 - 8.2 g/dL    Alb 4.6 3.4 - 5.0 g/dL    Albumin/Globulin Ratio 1.6 1.1 - 2.2    Total Bilirubin 0.6 0.0 - 1.0 mg/dL    Alkaline Phosphatase 53 40 - 129 U/L    ALT 32 10 - 40 U/L    AST 24 15 - 37 U/L    Globulin 2.8 g/dL   TSH with Reflex   Result Value Ref Range    TSH 2.75 0.27 - 4.20 uIU/mL   APTT   Result Value Ref Range    aPTT 39.8 (H) 24.2 - 36.2 sec   Protime-INR   Result Value Ref Range    Protime 18.2 (H) 10.0 - 13.2 sec    INR 1.56 (H) 0.86 - 1.14   Brain Natriuretic Peptide   Result Value Ref Range    Pro- (H) 0 - 124 pg/mL   Troponin   Result Value Ref Range    Troponin <0.01 <0.01 ng/mL   Troponin   Result Value Ref Range    Troponin <0.01 <0.01 ng/mL   POCT Glucose   Result Value Ref Range    Glucose 110 mg/dL   POCT Glucose   Result Value Ref Range    POC Glucose 110 (H) 70 - 99 mg/dl    Performed on ACCU-CHEK    EKG 12 Lead   Result Value Ref Range    Ventricular Rate 66 BPM    Atrial Rate 66 BPM    P-R Interval 138 ms    QRS Duration 88 ms    Q-T Interval 422 ms    QTc Calculation (Bazett) 442 ms    P Axis 26 degrees    R Axis 26 degrees    T Axis 54 degrees    Diagnosis       Normal sinus rhythmNonspecific ST abnormalityAbnormal ECGNo previous ECGs availableConfirmed by CJ Wilkins MD ([id number]) on 7/30/2020 11:54:15 AM     Imaging:  Ct Head Wo Contrast    Result Date: 7/30/2020  EXAMINATION: CT OF THE HEAD WITHOUT CONTRAST  7/30/2020 3:53 pm TECHNIQUE: CT of the head was performed without the administration of intravenous contrast. Dose modulation, iterative reconstruction, and/or weight based adjustment of the mA/kV was utilized to reduce the radiation dose to as low as reasonably achievable. COMPARISON: None. HISTORY: ORDERING SYSTEM PROVIDED HISTORY: dizziness, described as room spinning sensation TECHNOLOGIST PROVIDED HISTORY: Has a \"code stroke\" or \"stroke alert\" been called? ->No Reason for exam:->dizziness, described as room spinning sensation Reason for Exam: dizzy room spinning FINDINGS: BRAIN/VENTRICLES: There is a heterogeneous primarily cystic mass visualized in the right frontal lobe medially that measures approximately 3.6 x 2.6 cm. There is no evidence for hemorrhage. There is no midline shift. The ventricular structures are symmetric and unremarkable. The infratentorial structures are unremarkable. ORBITS: The visualized portion of the orbits demonstrate no acute abnormality. SINUSES: The visualized paranasal sinuses and mastoid air cells demonstrate no acute abnormality. SOFT TISSUES/SKULL:  No acute abnormality of the visualized skull or soft tissues. Heterogeneous primarily cystic mass in the right frontal lobe medially and correlation with pre and post-contrast MRI is recommended for further characterization. Ct Chest Pulmonary Embolism W Contrast    Result Date: 7/30/2020  EXAMINATION: CTA OF THE CHEST 7/30/2020 11:37 am TECHNIQUE: CTA of the chest was performed after the administration of intravenous contrast.  Multiplanar reformatted images are provided for review. MIP images are provided for review. Dose modulation, iterative reconstruction, and/or weight based adjustment of the mA/kV was utilized to reduce the radiation dose to as low as reasonably achievable. COMPARISON: None. HISTORY: ORDERING SYSTEM PROVIDED HISTORY: dizziness TECHNOLOGIST PROVIDED HISTORY: Reason for exam:->dizziness Reason for Exam: Yesterday at work experienced a \"head rush\" and bilateral extremity weakness. This moring feels fatigued and bilateral legs feel heavy. ) Acuity: Acute Type of Exam: Initial Additional signs and symptoms: dizzy Relevant Medical/Surgical History: PE, blood thinners FINDINGS: Pulmonary Arteries: Pulmonary arteries are adequately opacified for evaluation. No evidence of intraluminal filling defect to suggest pulmonary embolism. Main pulmonary artery is normal in caliber.  Mediastinum: No evidence of mediastinal lymphadenopathy. The heart and pericardium demonstrate no acute abnormality. There is no acute abnormality of the thoracic aorta. Lungs/pleura: There is mild bibasilar dependent atelectasis. No focal consolidation or pulmonary edema. No evidence of pleural effusion or pneumothorax. Upper Abdomen: Limited images of the upper abdomen are unremarkable. Incidental note is made of an IVC filter. Soft Tissues/Bones: No acute bone or soft tissue abnormality. No evidence of pulmonary embolism or acute pulmonary abnormality. Cta Head Neck W Contrast    Result Date: 7/30/2020  EXAMINATION: CTA OF THE HEAD AND NECK WITH CONTRAST 7/30/2020 3:58 pm: TECHNIQUE: CTA of the head and neck was performed with the administration of intravenous contrast. Multiplanar reformatted images are provided for review. MIP images are provided for review. Stenosis of the internal carotid arteries measured using NASCET criteria. Dose modulation, iterative reconstruction, and/or weight based adjustment of the mA/kV was utilized to reduce the radiation dose to as low as reasonably achievable. COMPARISON: CT head 07/30/2020 HISTORY: ORDERING SYSTEM PROVIDED HISTORY: dizziness, room spinning sensation TECHNOLOGIST PROVIDED HISTORY: Reason for exam:->dizziness, room spinning sensation Reason for Exam: DIZZINESS FINDINGS: CTA NECK: AORTIC ARCH/ARCH VESSELS: No dissection or arterial injury. No significant stenosis of the brachiocephalic or subclavian arteries. CAROTID ARTERIES: No dissection, arterial injury, or hemodynamically significant stenosis by NASCET criteria. VERTEBRAL ARTERIES: No dissection, arterial injury, or significant stenosis. SOFT TISSUES: The lung apices are clear. No cervical or superior mediastinal lymphadenopathy. The larynx and pharynx are unremarkable. No acute abnormality of the salivary and thyroid glands. BONES: Mild multilevel degenerate changes cervical spine.  CTA HEAD: ANTERIOR CIRCULATION: No significant stenosis of the intracranial internal carotid, anterior cerebral, or middle cerebral arteries. No aneurysm. POSTERIOR CIRCULATION: No significant stenosis of the vertebral, basilar, or posterior cerebral arteries. No aneurysm. OTHER: No dural venous sinus thrombosis on this non-dedicated study. BRAIN: Heterogeneous mass involving the right frontal lobe again identified 3.8 cm in AP dimension. This demonstrates a central area of hypoattenuating component which may represent cystic or central necrotic component and soft tissue point. There appears to be an adjacent satellite lesion measuring 5 mm in size. There is a formation of the corpus callosum. There is suspicion for a small enhancing component of the mass extending along the corpus callosum midline     Negative for large vessel occlusion or hemodynamic stenosis. Right frontal intraparenchymal mass again demonstrated, which appears to possibly extend across the midline minutes centrally necrotic or cystic component. Recommend further characterization with MRI performed without and with contrast.  Please note that a high-grade glioma would be considered in differential..         MDM:   Patient presenting for evaluation of dizziness. During work-up he was noted on CT scan to have evidence of intraparenchymal mass. I did review previous imaging from MRI performed in August 2019 at outside facility in [place] which showed at that point a 0.8 cm possible cystic structure at that time, but at this time mass appears to be much larger at approximately 3.8 cm in AP dimension per radiology read here. We did initially discuss with [facility] team but due to concern for possible need for neurosurgical intervention, neurosurgery was consulted and Dr. Donnie Ordoñez spoke with Dr. Mei Upton who felt that transfer to [facility] for further management was most appropriate.   I did also discuss management with the neurosurgeon on-call for [facility], ASHLEY., Dr. Elin Marroquin who is in agreement of this plan for transfer to their facility for further management under the hospitalist service for overall care. Neurosurgery will consult. Per current protocol at [address] test was sent and pending at time of transfer, which will also be needed if there is any surgical intervention performed. Patient advised on plan and is in agreement and consents to transfer and all questions have been answered for patient at time of transfer. I spoke with Dr. Anika Lane. We thoroughly discussed the history, physical exam, laboratory and imaging studies, as well as, emergency department course. Based upon that discussion, we've decided to admit Dioni Akhtar for further observation and evaluation of Janis Cuff Grotkowski's dizziness and brain mass/cystic structure. As I have deemed necessary from their history, physical and studies, I have considered and evaluated Dioni Akhtar for the following diagnoses:  DIABETES, INTRACRANIAL HEMORRHAGE, MENINGITIS, SUBARACHNOID HEMORRHAGE, SUBDURAL HEMATOMA, & STROKE. FINAL IMPRESSION  1. Dizziness    2. Mass of head        Vitals:  Blood pressure (!) 145/92, pulse 52, temperature 98.3 °F (36.8 °C), temperature source Oral, resp. rate 19, height 6' 3\" (1.905 m), weight 230 lb (104.3 kg), SpO2 96 %. Final Disposition:  Dioni Akhtar was transferred to [facility] as a direct admission in stable condition.        Marlee Lion MD  07/30/20 [id number]

## 2020-07-31 NOTE — H&P
further evaluation. For his idiopathic pulmonary embolism, he says he is gotten hypercoagulable work-up in the past with no significant abnormality, he says multiple male family members have this problem, 1 of his uncles is enrolled in a research study. His ancestors are Polish/northern . Past Medical History:          Diagnosis Date    Pulmonary embolism ([address]     Sarcoidosis        Past Surgical History:      No past surgical history on file. Medications Prior to Admission:      Prior to Admission medications    Medication Sig Start Date End Date Taking? Authorizing Provider   rivaroxaban (XARELTO) 20 MG TABS tablet Take 20 mg by mouth   Yes Historical Provider, MD       Allergies:  Clindamycin/lincomycin and Pcn [penicillins]    Social History:      The patient currently lives in [place]  He has a girlfriend who has 2 kids  He is originally from [facility]), but is in the [address] [place] [address] for work  He denies any tobacco product use, he does use alcohol but not excessively    Family History:    Male family members have idiopathic pulmonary embolism  Some family numbers have diabetes  prostate cancer and colon cancer as well    REVIEW OF SYSTEMS:   Pertinent positives as noted in the HPI. All other systems reviewed and negative. PHYSICAL EXAM PERFORMED:    BP (!) 143/86   Pulse 62   Temp 97.7 °F (36.5 °C) (Oral)   Resp 16   Ht 6' 3\" (1.905 m)   Wt 230 lb 6.1 oz (104.5 kg)   SpO2 95%   BMI 28.80 kg/m²     General appearance:  No apparent distress, appears stated age and cooperative. HEENT:  Normal cephalic, atraumatic without obvious deformity. Pupils equal, round, and reactive to light. Extra ocular muscles intact. Conjunctivae/corneas clear. Neck: Supple, with full range of motion. No jugular venous distention. Trachea midline. Respiratory:  Normal respiratory effort. Clear to auscultation, bilaterally without Rales/Wheezes/Rhonchi.   Cardiovascular:  Regular rate and rhythm with normal S1/S2 prophylaxis with Lovenox 40 mg once daily    COVID-19 rule out  He is a transfer and per protocol is covered PCR was sent  Continue droplet plus precautions    Lovenox for DVT Px  Diet: DIET GENERAL;  Code Status: Full Code    PT/OT Eval Status: N/8    Dispo - Admit as inpatient. I anticipate hospitalization spanning more than two midnights for investigation and treatment of the above medically necessary diagnoses. Helga Choi MD    Thank you No primary care provider on file. for the opportunity to be involved in this patient's care. If you have any questions or concerns please feel free to contact me at [phone].

## 2020-07-31 NOTE — ED NOTES
[facility] here to transport patient to [facility].. Report called to José Manuel cee RN.       Lavaun Councilman, RN  07/30/20 [id number]

## 2020-07-31 NOTE — PLAN OF CARE
Problem: Pain:  Goal: Control of acute pain  Description: Control of acute pain  Outcome: Ongoing  Note: Patient complained of headache. Tylenol given. Patient reported decrease in pain.       Problem: Infection:  Goal: Will remain free from infection  Description: Will remain free from infection  Outcome: Ongoing     Problem: Daily Care:  Goal: Daily care needs are met  Description: Daily care needs are met  Outcome: Ongoing

## 2020-08-01 LAB
ANTI-XA UNFRAC HEPARIN: 1.74 IU/ML (ref 0.3–0.7)
HCT VFR BLD CALC: 49 % (ref 40.5–52.5)
HEMOGLOBIN: 17.1 G/DL (ref 13.5–17.5)
INR BLD: 1.18 (ref 0.86–1.14)
MCH RBC QN AUTO: 31.1 PG (ref 26–34)
MCHC RBC AUTO-ENTMCNC: 34.8 G/DL (ref 31–36)
MCV RBC AUTO: 89.4 FL (ref 80–100)
PDW BLD-RTO: 12.7 % (ref 12.4–15.4)
PLATELET # BLD: 222 K/UL (ref 135–450)
PMV BLD AUTO: 8.9 FL (ref 5–10.5)
PROTHROMBIN TIME: 13.7 SEC (ref 10–13.2)
RBC # BLD: 5.48 M/UL (ref 4.2–5.9)
SARS-COV-2, NAA: NOT DETECTED
WBC # BLD: 16.4 K/UL (ref 4–11)

## 2020-08-01 PROCEDURE — 6360000002 HC RX W HCPCS: Performed by: NURSE PRACTITIONER

## 2020-08-01 PROCEDURE — 85610 PROTHROMBIN TIME: CPT

## 2020-08-01 PROCEDURE — 6360000002 HC RX W HCPCS: Performed by: INTERNAL MEDICINE

## 2020-08-01 PROCEDURE — 2060000000 HC ICU INTERMEDIATE R&B

## 2020-08-01 PROCEDURE — 6370000000 HC RX 637 (ALT 250 FOR IP): Performed by: NURSE PRACTITIONER

## 2020-08-01 PROCEDURE — 36415 COLL VENOUS BLD VENIPUNCTURE: CPT

## 2020-08-01 PROCEDURE — 6370000000 HC RX 637 (ALT 250 FOR IP): Performed by: INTERNAL MEDICINE

## 2020-08-01 PROCEDURE — 2580000003 HC RX 258: Performed by: INTERNAL MEDICINE

## 2020-08-01 PROCEDURE — 85520 HEPARIN ASSAY: CPT

## 2020-08-01 PROCEDURE — 85027 COMPLETE CBC AUTOMATED: CPT

## 2020-08-01 RX ORDER — HEPARIN SODIUM 5000 [USP'U]/ML
40 INJECTION, SOLUTION INTRAVENOUS; SUBCUTANEOUS PRN
Status: DISCONTINUED | OUTPATIENT
Start: 2020-08-01 | End: 2020-08-04

## 2020-08-01 RX ORDER — HEPARIN SODIUM 10000 [USP'U]/100ML
10 INJECTION, SOLUTION INTRAVENOUS CONTINUOUS
Status: DISCONTINUED | OUTPATIENT
Start: 2020-08-01 | End: 2020-08-04

## 2020-08-01 RX ORDER — HEPARIN SODIUM 5000 [USP'U]/ML
80 INJECTION, SOLUTION INTRAVENOUS; SUBCUTANEOUS PRN
Status: DISCONTINUED | OUTPATIENT
Start: 2020-08-01 | End: 2020-08-04

## 2020-08-01 RX ORDER — HEPARIN SODIUM 5000 [USP'U]/ML
80 INJECTION, SOLUTION INTRAVENOUS; SUBCUTANEOUS ONCE
Status: COMPLETED | OUTPATIENT
Start: 2020-08-01 | End: 2020-08-01

## 2020-08-01 RX ORDER — BUTALBITAL, ACETAMINOPHEN AND CAFFEINE 50; 325; 40 MG/1; MG/1; MG/1
2 TABLET ORAL ONCE
Status: COMPLETED | OUTPATIENT
Start: 2020-08-01 | End: 2020-08-01

## 2020-08-01 RX ADMIN — Medication 10 ML: at 10:00

## 2020-08-01 RX ADMIN — ENOXAPARIN SODIUM 40 MG: 40 INJECTION SUBCUTANEOUS at 10:00

## 2020-08-01 RX ADMIN — PANTOPRAZOLE SODIUM 40 MG: 40 TABLET, DELAYED RELEASE ORAL at 10:00

## 2020-08-01 RX ADMIN — ACETAMINOPHEN 650 MG: 325 TABLET ORAL at 10:51

## 2020-08-01 RX ADMIN — DEXAMETHASONE SODIUM PHOSPHATE 4 MG: 4 INJECTION, SOLUTION INTRAMUSCULAR; INTRAVENOUS at 03:05

## 2020-08-01 RX ADMIN — LEVETIRACETAM 500 MG: 500 TABLET, FILM COATED ORAL at 10:00

## 2020-08-01 RX ADMIN — DEXAMETHASONE SODIUM PHOSPHATE 4 MG: 4 INJECTION, SOLUTION INTRAMUSCULAR; INTRAVENOUS at 15:21

## 2020-08-01 RX ADMIN — HEPARIN SODIUM 8350 UNITS: 5000 INJECTION INTRAVENOUS; SUBCUTANEOUS at 15:09

## 2020-08-01 RX ADMIN — BUTALBITAL, ACETAMINOPHEN, AND CAFFEINE 2 TABLET: 50; 325; 40 TABLET ORAL at 00:29

## 2020-08-01 RX ADMIN — BUTALBITAL, ACETAMINOPHEN, AND CAFFEINE 2 TABLET: 50; 325; 40 TABLET ORAL at 23:28

## 2020-08-01 RX ADMIN — HEPARIN SODIUM 18 UNITS/KG/HR: 10000 INJECTION, SOLUTION INTRAVENOUS at 15:07

## 2020-08-01 RX ADMIN — DEXAMETHASONE SODIUM PHOSPHATE 4 MG: 4 INJECTION, SOLUTION INTRAMUSCULAR; INTRAVENOUS at 09:59

## 2020-08-01 RX ADMIN — ACETAMINOPHEN 650 MG: 325 TABLET ORAL at 20:30

## 2020-08-01 RX ADMIN — LEVETIRACETAM 500 MG: 500 TABLET, FILM COATED ORAL at 20:30

## 2020-08-01 RX ADMIN — Medication 10 ML: at 03:05

## 2020-08-01 RX ADMIN — DEXAMETHASONE SODIUM PHOSPHATE 4 MG: 4 INJECTION, SOLUTION INTRAMUSCULAR; INTRAVENOUS at 20:30

## 2020-08-01 ASSESSMENT — PAIN DESCRIPTION - PROGRESSION
CLINICAL_PROGRESSION: NOT CHANGED
CLINICAL_PROGRESSION: NOT CHANGED

## 2020-08-01 ASSESSMENT — PAIN DESCRIPTION - DESCRIPTORS
DESCRIPTORS: ACHING
DESCRIPTORS: ACHING;HEADACHE

## 2020-08-01 ASSESSMENT — PAIN DESCRIPTION - PAIN TYPE
TYPE: ACUTE PAIN
TYPE: ACUTE PAIN

## 2020-08-01 ASSESSMENT — PAIN SCALES - GENERAL
PAINLEVEL_OUTOF10: 0
PAINLEVEL_OUTOF10: 0
PAINLEVEL_OUTOF10: 5
PAINLEVEL_OUTOF10: 0
PAINLEVEL_OUTOF10: 3
PAINLEVEL_OUTOF10: 3
PAINLEVEL_OUTOF10: 4

## 2020-08-01 ASSESSMENT — PAIN DESCRIPTION - ORIENTATION
ORIENTATION: MID
ORIENTATION: MID

## 2020-08-01 ASSESSMENT — PAIN DESCRIPTION - FREQUENCY
FREQUENCY: INTERMITTENT
FREQUENCY: CONTINUOUS

## 2020-08-01 ASSESSMENT — PAIN DESCRIPTION - ONSET
ONSET: ON-GOING
ONSET: ON-GOING

## 2020-08-01 ASSESSMENT — PAIN - FUNCTIONAL ASSESSMENT
PAIN_FUNCTIONAL_ASSESSMENT: ACTIVITIES ARE NOT PREVENTED
PAIN_FUNCTIONAL_ASSESSMENT: ACTIVITIES ARE NOT PREVENTED

## 2020-08-01 ASSESSMENT — PAIN DESCRIPTION - LOCATION
LOCATION: HEAD
LOCATION: HEAD

## 2020-08-01 NOTE — PROGRESS NOTES
Not waking patient for assessment, vitals, or weight at this time. Patient was awake most of night and is sleeping now. Will perform assessments once patient is awake. No needs at this time. Patient resting comfortably in bed. Call light in reach. Will continue to monitor.    Electronically signed by Eligio Calzada RN on 8/1/2020 at 5:41 AM

## 2020-08-01 NOTE — PROGRESS NOTES
Physician Progress Note      PATIENT:               Madeleine Wright  CSN #:                  [id number]  :                       1975  ADMIT DATE:       2020 10:15 PM  [address] [place] DATE:  RESPONDING  PROVIDER #:        Madelyn Mathur MD          QUERY TEXT:    Pt admitted with brain mass and has ideopathic pulmonary embolism documented. If possible, please document in progress notes and discharge summary if you   are evaluating and/or treating any of the following: The medical record reflects the following:  Risk Factors: hx DVT  Clinical Indicators:  CTA: Negative for PE; Per Neurosurg c/s had PE   diagnosed in 2019  Treatment: Maintenance Xarelto on hold, on Lovenox  Options provided:  -- History of ideopathic pulmonary embolism  -- Chronic pulmonary embolism  -- Other - I will add my own diagnosis  -- Disagree - Clinically unable to determine / Unknown  -- Refer to Clinical Documentation Reviewer    PROVIDER RESPONSE TEXT:    History of recurrent idiopathic pulmonary embolism    Query created by:  Elena Manriquez on 2020 12:56 PM      Electronically signed by:  Madelyn Mathur MD 2020 1:22 PM

## 2020-08-01 NOTE — PROGRESS NOTES
Following secure message sent to house MD:    Patient admitted last night with right sided weakness. Diagnosed with frontal lobe mass. Neuro following. Patient is c/o HA throughout day today. He is asking for Tylenol more than every four hours. HA is 4-5/10. Can we alternate with something else to help with HA? Ibuprofen, Toradol, or tramadol. Whichever is appropriate.       Electronically signed by Mikey Pedraza RN on 7/31/2020 at 11:25 PM

## 2020-08-01 NOTE — CONSULTS
Heme/Onc    See dictation    A/P:  1. Brain lesion--MRI pending. Based on CT scans, this appears to be a primary brain issue. Neurosurgery following. 2. Recurrent DVT/PE. Previous hypercoag workup reportedly negative. On xarelto. Had Heath filter placed in 2006/2007. Still in. Obviously the least amount of time off anticoagulation the better. Consider starting heparin now if safe depending on bleeding risk up until surgery. If efficacy of Heath needs to be assessed, consider consulting Vascular Surgery. Thanks for consult. Will follow closely.     Ruby Tripp MD

## 2020-08-01 NOTE — CONSULTS
NEUROSURGERY CONSULT  Jim Magallon  [id number]   1975 7/31/2020    Requesting physician: Madeline Chan MD    Reason for consultation: brain mass    History of present illness: Due to limiting exposure to COVID-19, initial encounter was completed by using EMR and from conversations with medical team involved in case. Patient was not interviewed or examined, at this time. I have personally reviewed the reports and images of labs, radiological studies, current and old medical records    Patient is a 53 y.o. male w/ PMH idiopathic pulmonary embolism, DVTs and sarcoidosis who presented on 7/31/2020 with dizziness and feeling like his legs were heavy at work. The patient lives in [place] and is here temporarily on a job, he began feeling dizzy two days ago and he boss drove him home. He went to work again yesterday and coworkers noticed he did not look well, and the patient felt like he was unable to drive back home so he was brought to the ED for evaluation. Patient reported in the ED feeling like the room was spinning and he was going to pass out, was given a liter bolus with very temporary improvement. Initial head CT revealed Heterogeneous primarily cystic mass in the right frontal lobe medially. The patient does have a history of DVT and PE. From previous medical records in [place] he had an IVC filter placed that was eventually displaced by an embolus after failed Coumadin therapy (INR was 3 at the time), and had bilateral moderate pulmonary emboli in August 2019. During that hospitlization a head CT was performed due to headache on Coumadin which revealed a curvilinear sulcal hyperdensity in the inferior left occipital region which he was supposed to have follow up MRI imaging in 6 months.  He had a hernando filter placed at that time and was taking Danny Klinefelter has been held since admission to the hospital.     ROS:   MARIAH 2/2 patient in covid-19 rule out isolation    Allergies   Allergen Reactions    Clindamycin/Lincomycin     Pcn [Penicillins]        Past Medical History:   Diagnosis Date    DVT (deep vein thrombosis) in pregnancy     Takes Xarelto    Pulmonary embolism ([address]     Sarcoidosis         No past surgical history on file. Social History     Occupational History    Not on file   Tobacco Use    Smoking status: Never Smoker    Smokeless tobacco: Never Used   Substance and Sexual Activity    Alcohol use: Yes     Comment: occas    Drug use: Not Currently    Sexual activity: Not on file        No family history on file.      Outpatient Medications Marked as Taking for the 7/30/20 encounter [facility] Encounter)   Medication Sig Dispense Refill    rivaroxaban (XARELTO) 20 MG TABS tablet Take 20 mg by mouth        Current Facility-Administered Medications   Medication Dose Route Frequency Provider Last Rate Last Dose    dexamethasone (DECADRON) injection 4 mg  4 mg Intravenous Q6H ARCHANA Milton CNP   4 mg at 07/31/20 2038    levETIRAcetam (KEPPRA) tablet 500 mg  500 mg Oral BID ARCHANA Milton CNP   500 mg at 07/31/20 2038    [START ON 8/1/2020] pantoprazole (PROTONIX) tablet 40 mg  40 mg Oral QAM AC ARCHANA Milton CNP        sodium chloride flush 0.9 % injection 10 mL  10 mL Intravenous 2 times per day Denys Hodges MD   10 mL at 07/31/20 2038    sodium chloride flush 0.9 % injection 10 mL  10 mL Intravenous PRN Denys Hodges MD        acetaminophen (TYLENOL) tablet 650 mg  650 mg Oral Q6H PRN Denys Hodges MD   650 mg at 07/31/20 2038    Or    acetaminophen (TYLENOL) suppository 650 mg  650 mg Rectal Q6H PRN Denys Hodges MD        polyethylene glycol [facility]) packet 17 g  17 g Oral Daily PRN Denys Hodges MD        promethazine (PHENERGAN) tablet 12.5 mg  12.5 mg Oral Q6H PRN Denys Hodges MD        Or    ondansetron [facility] injection 4 mg  4 mg Intravenous Q6H PRN Denys Hodges MD        enoxaparin (LOVENOX) injection 40 mg  40 mg Subcutaneous Daily Lázaro Valdez MD   40 mg at 07/31/20 [id number]      Objective:  BP (!) 143/90   Pulse 89   Temp 96.1 °F (35.6 °C) (Oral)   Resp 18   Ht 6' 3\" (1.905 m)   Wt 230 lb 6.1 oz (104.5 kg)   SpO2 94%   BMI 28.80 kg/m²     Physical Exam:  Patient was seen during Covid-19 pandemic and all efforts made to respect recommendations of social distancing and decrease PPE have been made. Unable to perform face to face examination of the patient. Radiological Findings:    I personally reviewed the patient's imaging which consists of a CT and CTA of the head dated 7/30/2020. These demonstrate a right frontal mass with surrounding vasogenic edema. No acute vascular abnormality is noted. Labs  Recent Labs     07/30/20  1025 07/30/20  1051 07/31/20  0520     --  140     --  108   CO2 26  --  22   BUN 15  --  13   CREATININE 1.1  --  1.1   GLUCOSE 106* 110 97     Recent Labs     07/30/20  1025 07/31/20  0520   WBC 6.9 7.1   RBC 5.77 5.29   INR 1.56* 1.14       Patient Active Problem List    Diagnosis Date Noted    Brain mass 07/30/2020       Assessment:  53year old male experiencing dizziness and leg heaviness who was found to have a right frontal mass    Plan:  -Neuro stable  -HOB elevated  -Frequent neuro checks  -MRI brain with and without contrast, Brainlab protocol  -CT chest/abd/pelvis  -Hold Xarelto  -Keppra for seizure ppx  -Decadron 4mg q6 hrs  -PPI, SSI while on Decadron  -Heme Onc consult for hx of recurrent DVTs, PE  -Plan for surgery early next week pending results of updated imaging  -Will follow closely.     Jc Hammer MD, PhD  [address], [address] [place], [place], [address] [phone] (c), [phone] (o)

## 2020-08-01 NOTE — PROGRESS NOTES
Patient is A&O x3.  RA, sat 96%. No complaints of SOB. Medicated with tylenol as needed for c/o HA. Respirations appear to easy and unlabored. Lungs clear. Respirations easy with no complaints of cough. No complaints of nausea/vomiting/diarrhea. Up ad saturnino to the bathroom/BSC as needed. Right AC PIV intact and flushed. Neurological assessment WNL. Tolerating regular diet. Plan of care and safety measures reviewed with the patient. Call light in reach. Will continue to monitor.   Electronically signed by Stacy Ojeda RN on 7/31/2020 at 9:18 PM

## 2020-08-01 NOTE — PROGRESS NOTES
Patient in bed resting comfortably. Respirations steady and unlabored. No signs of respiratory or cardiac distress. No complaints of pain at this time. No needs at this time. Call light in reach. Will continue to monitor.   Electronically signed by Stacia Silveira RN on 8/1/2020 at 1:39 AM

## 2020-08-01 NOTE — PROGRESS NOTES
Called and updated pt wife Cristal Rocha on the plan of care. She is driving down from [place]. All questions answered.     Electronically signed by Karolyn Zamarripa RN on 8/1/2020 at 2:29 PM

## 2020-08-01 NOTE — PROGRESS NOTES
Patient arrived to room 5509 from PCU. Patient A&Ox4, VSS. Neuro checks at baseline, pt denies numbness or tingling. Patient currently denies pain or nausea, tolerates diet well. Pt oriented to room and instructed to call out for needs. Fall precautions in place.

## 2020-08-01 NOTE — PROGRESS NOTES
Hospitalist Progress Note      PCP: No primary care provider on file. Chief Complaint. Presented to hospital for right frontal lobe brain mass    Date of Admission: 7/30/2020      Subjective:   denies chest pain, nausea, vomiting, shortness of breath, fever or chills  No headache  Medications:  Reviewed    Infusion Medications   Scheduled Medications    dexamethasone  4 mg Intravenous Q6H    levETIRAcetam  500 mg Oral BID    pantoprazole  40 mg Oral QAM AC    sodium chloride flush  10 mL Intravenous 2 times per day    enoxaparin  40 mg Subcutaneous Daily     PRN Meds: sodium chloride flush, acetaminophen **OR** acetaminophen, polyethylene glycol, promethazine **OR** ondansetron      Intake/Output Summary (Last 24 hours) at 8/1/2020 1325  Last data filed at 8/1/2020 1103  Gross per 24 hour   Intake 2320 ml   Output 0 ml   Net 2320 ml       Physical Exam Performed:    BP (!) 142/83   Pulse 87   Temp 96.8 °F (36 °C) (Oral)   Resp 18   Ht 6' 3\" (1.905 m)   Wt 230 lb 6.1 oz (104.5 kg)   SpO2 95%   BMI 28.80 kg/m²     General appearance: No apparent distress,   HEENT:  Conjunctivae/corneas clear. Neck: Supple, with full range of motion. Respiratory:  Normal respiratory effort. Clear to auscultation, bilaterally without Rales/Wheezes/Rhonchi. Cardiovascular: Regular rate and rhythm with normal S1/S2 without murmurs or rubs  Abdomen: Soft, non-tender, non-distended, normal bowel sounds. Musculoskeletal: No cyanosis or edema bilaterally  Neurologic:  without any focal sensory/motor deficits.  grossly non-focal.  Psychiatric: Alert and oriented, Normal mood  Peripheral Pulses: +2 palpable, equal bilaterally       Labs:   Recent Labs     07/30/20  1025 07/31/20  0520   WBC 6.9 7.1   HGB 17.3 16.7   HCT 50.5 47.2    196     Recent Labs     07/30/20  1025 07/31/20  0520    140   K 4.2 4.0    108   CO2 26 22   BUN 15 13   CREATININE 1.1 1.1   CALCIUM 9.8 9.2     Recent Labs 07/30/20  1025   AST 24   ALT 32   BILITOT 0.6   ALKPHOS 53     Recent Labs     07/30/20  1025 07/31/20  0520   INR 1.56* 1.14     Recent Labs     07/30/20  1025 07/30/20  1620   TROPONINI <0.01 <0.01       Urinalysis:    No results found for: Tony Blander, BACTERIA, RBCUA, BLOODU, SPECGRAV, GLUCOSEU    Radiology:  CT CHEST ABDOMEN PELVIS W CONTRAST   Final Result      Unremarkable CT chest abdomen pelvis. No signs of primary or metastatic tumor. No lymphadenopathy. MRI BRAIN W WO CONTRAST    (Results Pending)         Assessment/Plan:    Active Hospital Problems    Diagnosis    Brain mass [G93.89]     Patient is a 78-year-old male with past medical history of DVT/PE on Xarelto who presented to hospital as a transfer from outside facility for right-sided frontal lobe cystic mass.       Right frontal lobe cystic brain mass  Seen by neurosurgery  On Decadron  No focal neurology  MRI done report pending  CT chest abdomen pelvis was negative  On Keppra for seizure prophylaxis      Previous recurrent DVT and PE  Was on Xarelto which was discontinued and started on Lovenox DVT prophylaxis  Seen by hematology who recommended starting him on heparin depending on the bleeding risk  Will discuss with neurosurgery MRI report is still pending    Discussed with neurosurgery  Okay to start on IV heparin drip  Can be discontinued Monday Monday morning  Given the location of the lesion surgery needs to be seen sooner rather than later  I discussed this with the patient who voiced understanding    Diet: DIET GENERAL;  Code Status: Full Code    PT/OT Eval Status: ordered    Uday Lazaro MD

## 2020-08-01 NOTE — PLAN OF CARE
Pain/discomfort being managed with PRN analgesics per MD orders. Pt able to express presence and absence of pain and rate pain appropriately using numerical scale. Pt free from falls this shift. Fall precautions in place at all times. Call light always withinreach. Pt able and agreeable to contact for safety appropriately.

## 2020-08-01 NOTE — PROGRESS NOTES
Called report to [facility] receiving the pt to room 5503.     Electronically signed by Andra Waldron RN on 8/1/2020 at 1:10 PM

## 2020-08-01 NOTE — CONSULTS
[address]               [address] [facility] [address]                                  CONSULTATION    PATIENT NAME: Juni Black                    :        1975  MED REC NO:   [id number]                          ROOM:       [address] [facility]  ACCOUNT NO:   [de-identified]                           ADMIT DATE: 2020  PROVIDER:     Keith Conwya MD    CONSULT DATE:  2020    REASON FOR CONSULTATION:  History of brain lesion as well as recurrent  DVT and PE.    CONSULTING PHYSICIAN:  Dr. Andria Vila.    Josiah Garcia:  The patient is a 78-year-old gentleman with  history of recurrent DVT and PE, on Xarelto, who presented to the  hospital on 2020 with some dizziness and heaviness in his legs. He lives in [place], but is here on business. His friend encouraged  him to come to the ER. A PE study was done that was negative for PE. A  CT of the head was done that showed a 3.6 cm primarily cystic mass in  the right frontal lobe. An MRI was done, but it is still pending. CT  scans to look for primary were negative for a primary elsewhere. In terms of his history of DVT and PE, his first was in 2006 he  believes. He was put on Coumadin, and a week after being discharged, he  had another PE and had an IVC filter placed. He was on Coumadin for  nine months. He then had another DVT and PE roughly a year later that  displaced his original IVC filter. He was then treated with heparin,  and a [place] filter was placed since the IVC filter was displaced  and could not be retrieved. He was discharged again on Coumadin. He  then had a recurrent DVT and PE despite being therapeutic on Coumadin  _____ last year in [place]. He was given heparin for several days and  then discharged on Xarelto which he has been on ever since. A hypercoag  workup was done that was reportedly negative at the [facility].   He was discovered during that admission to have a cystic  lesion in his brain, and a repeat MRI was supposed to be done in  03/2020, but was not done due to [place]. PAST MEDICAL HISTORY:  1. Recurrent DVT and PE.  2.  Sarcoidosis. PAST SURGICAL HISTORY:  As above. ALLERGIES:  He is allergic to CLINDAMYCIN and PENICILLIN, which caused  unknown reactions. MEDICATIONS:  His medicines are Decadron 4 mg IV q.6 hours, Keppra 500  mg p.o. b.i.d., and Protonix 40 mg p.o. daily. SOCIAL HISTORY:  He is , but he has a girlfriend. He does not  smoke. Drinks occasionally. He works for a [employer]. FAMILY HISTORY:  His father and grandfather had clots. There is a  family history of prostate cancer. REVIEW OF SYSTEMS:  He denies any recent fever; chills; sweats; nausea;  vomiting; chest pain; shortness of breath; dysphagia; odynophagia;  diarrhea; constipation; hemoptysis; hematemesis; change in vision,  hearing, smell, or taste; weakness, neuropathy, skin rashes, productive  cough, urinary or bowel prolapse or incontinence, petechiae, purpura,  skin rashes, pruritus, hallucinations, nasal congestion or drainage,  depression, anxiety, suicidal ideations, melena, or hematochezia. He  has mild-to-moderate fatigue. He has dizziness that is better. His  10-system review of systems is otherwise negative. PHYSICAL EXAMINATION:  VITAL SIGNS:  He is afebrile with normal vital signs. GENERAL:  He is in no acute distress. HEENT:  His pupils are round and reactive to light and accommodation. Extraocular muscles are intact. NECK:  He has no jugular venous distention. No thyromegaly. His  oropharynx is clear. He has no carotid bruits. He has no palpable  lymphadenopathy. HEART:  Regular rate and rhythm. LUNGS:  Clear to auscultation bilaterally. ABDOMEN:  Nondistended and nontender with bowel sounds x4. No  hepatosplenomegaly.   EXTREMITIES:  He has no peripheral clubbing, cyanosis or edema.  NEUROLOGIC:  Nonfocal.    ASSESSMENT AND PLAN:  1. Brain lesion. His MRI is pending. Based on his CT scans, this  appears to be a primary brain issue. Neurosurgery is following. 2.  Recurrent DVT and PE. His previous hypercoag workup was reportedly  negative. He came in on Xarelto, which is on hold. He had a [place]  filter placed in 2006 or 2007. This is still in. Obviously, the least  amount of time he can be off anticoagulation the better. I will  consider starting heparin now if it is safe depending on his bleeding  risk up until the time of surgery. If the efficacy of the [place]  filter needs be assessed, I would consider consulting Vascular Surgery. Thank you for the consultation. We will follow closely.         Feliz Soriano MD    D: 08/01/2020 9:49:20       T: 08/01/2020 11:30:16     [email or web address]  Job#: [id number]     Doc#: [id number]    CC:

## 2020-08-02 LAB
ANION GAP SERPL CALCULATED.3IONS-SCNC: 12 MMOL/L (ref 3–16)
ANTI-XA UNFRAC HEPARIN: 0.56 IU/ML (ref 0.3–0.7)
ANTI-XA UNFRAC HEPARIN: 0.78 IU/ML (ref 0.3–0.7)
ANTI-XA UNFRAC HEPARIN: 1.2 IU/ML (ref 0.3–0.7)
BASOPHILS ABSOLUTE: 0 K/UL (ref 0–0.2)
BASOPHILS RELATIVE PERCENT: 0.1 %
BUN BLDV-MCNC: 21 MG/DL (ref 7–20)
CALCIUM SERPL-MCNC: 9.2 MG/DL (ref 8.3–10.6)
CHLORIDE BLD-SCNC: 104 MMOL/L (ref 99–110)
CO2: 22 MMOL/L (ref 21–32)
CREAT SERPL-MCNC: 1.2 MG/DL (ref 0.9–1.3)
EOSINOPHILS ABSOLUTE: 0 K/UL (ref 0–0.6)
EOSINOPHILS RELATIVE PERCENT: 0 %
GFR AFRICAN AMERICAN: >60
GFR NON-AFRICAN AMERICAN: >60
GLUCOSE BLD-MCNC: 146 MG/DL (ref 70–99)
HCT VFR BLD CALC: 46.8 % (ref 40.5–52.5)
HEMOGLOBIN: 16.4 G/DL (ref 13.5–17.5)
INR BLD: 1.2 (ref 0.86–1.14)
LYMPHOCYTES ABSOLUTE: 0.8 K/UL (ref 1–5.1)
LYMPHOCYTES RELATIVE PERCENT: 4.8 %
MCH RBC QN AUTO: 31.4 PG (ref 26–34)
MCHC RBC AUTO-ENTMCNC: 35 G/DL (ref 31–36)
MCV RBC AUTO: 89.7 FL (ref 80–100)
MONOCYTES ABSOLUTE: 0.5 K/UL (ref 0–1.3)
MONOCYTES RELATIVE PERCENT: 3.1 %
NEUTROPHILS ABSOLUTE: 15 K/UL (ref 1.7–7.7)
NEUTROPHILS RELATIVE PERCENT: 92 %
PDW BLD-RTO: 12.6 % (ref 12.4–15.4)
PLATELET # BLD: 217 K/UL (ref 135–450)
PMV BLD AUTO: 9.1 FL (ref 5–10.5)
POTASSIUM SERPL-SCNC: 4 MMOL/L (ref 3.5–5.1)
PROTHROMBIN TIME: 14 SEC (ref 10–13.2)
RBC # BLD: 5.22 M/UL (ref 4.2–5.9)
SODIUM BLD-SCNC: 138 MMOL/L (ref 136–145)
WBC # BLD: 16.3 K/UL (ref 4–11)

## 2020-08-02 PROCEDURE — 36415 COLL VENOUS BLD VENIPUNCTURE: CPT

## 2020-08-02 PROCEDURE — 6370000000 HC RX 637 (ALT 250 FOR IP): Performed by: INTERNAL MEDICINE

## 2020-08-02 PROCEDURE — 2060000000 HC ICU INTERMEDIATE R&B

## 2020-08-02 PROCEDURE — 85025 COMPLETE CBC W/AUTO DIFF WBC: CPT

## 2020-08-02 PROCEDURE — 2580000003 HC RX 258: Performed by: INTERNAL MEDICINE

## 2020-08-02 PROCEDURE — 85520 HEPARIN ASSAY: CPT

## 2020-08-02 PROCEDURE — 85610 PROTHROMBIN TIME: CPT

## 2020-08-02 PROCEDURE — 80048 BASIC METABOLIC PNL TOTAL CA: CPT

## 2020-08-02 PROCEDURE — 6370000000 HC RX 637 (ALT 250 FOR IP): Performed by: NURSE PRACTITIONER

## 2020-08-02 PROCEDURE — 6360000002 HC RX W HCPCS: Performed by: INTERNAL MEDICINE

## 2020-08-02 PROCEDURE — 6360000002 HC RX W HCPCS: Performed by: NURSE PRACTITIONER

## 2020-08-02 RX ADMIN — ACETAMINOPHEN 650 MG: 325 TABLET ORAL at 20:31

## 2020-08-02 RX ADMIN — DEXAMETHASONE SODIUM PHOSPHATE 4 MG: 4 INJECTION, SOLUTION INTRAMUSCULAR; INTRAVENOUS at 03:15

## 2020-08-02 RX ADMIN — LEVETIRACETAM 500 MG: 500 TABLET, FILM COATED ORAL at 08:43

## 2020-08-02 RX ADMIN — PANTOPRAZOLE SODIUM 40 MG: 40 TABLET, DELAYED RELEASE ORAL at 06:42

## 2020-08-02 RX ADMIN — HEPARIN SODIUM 12 UNITS/KG/HR: 10000 INJECTION, SOLUTION INTRAVENOUS at 08:45

## 2020-08-02 RX ADMIN — DEXAMETHASONE SODIUM PHOSPHATE 4 MG: 4 INJECTION, SOLUTION INTRAMUSCULAR; INTRAVENOUS at 20:30

## 2020-08-02 RX ADMIN — ACETAMINOPHEN 650 MG: 325 TABLET ORAL at 06:40

## 2020-08-02 RX ADMIN — Medication 10 ML: at 07:27

## 2020-08-02 RX ADMIN — DEXAMETHASONE SODIUM PHOSPHATE 4 MG: 4 INJECTION, SOLUTION INTRAMUSCULAR; INTRAVENOUS at 14:14

## 2020-08-02 RX ADMIN — LEVETIRACETAM 500 MG: 500 TABLET, FILM COATED ORAL at 20:30

## 2020-08-02 RX ADMIN — Medication 10 ML: at 20:33

## 2020-08-02 RX ADMIN — DEXAMETHASONE SODIUM PHOSPHATE 4 MG: 4 INJECTION, SOLUTION INTRAMUSCULAR; INTRAVENOUS at 08:43

## 2020-08-02 ASSESSMENT — PAIN SCALES - GENERAL
PAINLEVEL_OUTOF10: 0
PAINLEVEL_OUTOF10: 2
PAINLEVEL_OUTOF10: 1
PAINLEVEL_OUTOF10: 3

## 2020-08-02 ASSESSMENT — PAIN DESCRIPTION - FREQUENCY: FREQUENCY: CONTINUOUS

## 2020-08-02 ASSESSMENT — PAIN DESCRIPTION - ONSET: ONSET: ON-GOING

## 2020-08-02 ASSESSMENT — PAIN DESCRIPTION - ORIENTATION: ORIENTATION: MID

## 2020-08-02 ASSESSMENT — PAIN DESCRIPTION - PROGRESSION: CLINICAL_PROGRESSION: NOT CHANGED

## 2020-08-02 ASSESSMENT — PAIN DESCRIPTION - DESCRIPTORS: DESCRIPTORS: ACHING

## 2020-08-02 ASSESSMENT — PAIN DESCRIPTION - LOCATION: LOCATION: HEAD

## 2020-08-02 ASSESSMENT — PAIN DESCRIPTION - PAIN TYPE: TYPE: ACUTE PAIN

## 2020-08-02 NOTE — PROGRESS NOTES
Patient currently resting in bed. Significant other, Cristal Rocha, present at bedside. Patient alert and oriented x 4 and in no acute distress. Heparin gtt infusing per order/ flow sheet. Bed alarm on and nurse assisting patient to bathroom due to prior complaints of dizziness. Nurse will continue to monitor/reassess. Call light within reach.  Bevely Mary

## 2020-08-02 NOTE — PROGRESS NOTES
Hospitalist Progress Note      PCP: No primary care provider on file. Date of Admission: 7/30/2020    Chief Complaint: right frontal lobe brain mass    Hospital Course: Patient is a 57-year-old male with past medical history of DVT/PE on Xarelto who presented to hospital as a transfer from outside facility for right-sided frontal lobe cystic mass. Subjective: Patient was seen and examined today. Significant other present at bedside. Denies any dizziness, lightheadedness. Denies any nausea, vomiting. No acute event reported overnight. Dr Jabier Hernández to see patient in a.m. to discuss about doing surgery while in-house or at [facility]. Medications:  Reviewed    Infusion Medications    heparin (porcine) 12 Units/kg/hr (08/02/20 0845)     Scheduled Medications    dexamethasone  4 mg Intravenous Q6H    levETIRAcetam  500 mg Oral BID    pantoprazole  40 mg Oral QAM AC    sodium chloride flush  10 mL Intravenous 2 times per day     PRN Meds: heparin (porcine), heparin (porcine), sodium chloride flush, acetaminophen **OR** acetaminophen, polyethylene glycol, promethazine **OR** ondansetron      Intake/Output Summary (Last 24 hours) at 8/2/2020 1219  Last data filed at 8/1/2020 2207  Gross per 24 hour   Intake 340 ml   Output --   Net 340 ml       Physical Exam Performed:    /76   Pulse 64   Temp 97.9 °F (36.6 °C) (Oral)   Resp 16   Ht 6' 3\" (1.905 m)   Wt 230 lb 6.1 oz (104.5 kg)   SpO2 92%   BMI 28.80 kg/m²     General appearance: No apparent distress, appears stated age and cooperative. HEENT: Pupils equal, round, and reactive to light. Conjunctivae/corneas clear. Neck: Supple, with full range of motion. No jugular venous distention. Trachea midline. Respiratory:  Normal respiratory effort. Clear to auscultation, bilaterally without Rales/Wheezes/Rhonchi. Cardiovascular: Regular rate and rhythm with normal S1/S2 without murmurs, rubs or gallops.   Abdomen: Soft, him on heparin depending on the bleeding risk      DVT Prophylaxis: Heparin  Diet: DIET GENERAL;  Code Status: Full Code    PT/OT Eval Status: Pending    Dispo -inpatient. Neurosurgery to decide about timing for surgery.     Joby Hart MD

## 2020-08-02 NOTE — PROGRESS NOTES
Hematology Oncology Daily Progress Note    Admit Date: 7/30/2020  Hospital day several    Subjective:     Patient has complaints of stable dizziness--denies sob/cp. Medication side effects: none    Scheduled Meds:   dexamethasone  4 mg Intravenous Q6H    levETIRAcetam  500 mg Oral BID    pantoprazole  40 mg Oral QAM AC    sodium chloride flush  10 mL Intravenous 2 times per day     Continuous Infusions:   heparin (porcine) 12 Units/kg/hr (08/02/20 0845)     PRN Meds:heparin (porcine), heparin (porcine), sodium chloride flush, acetaminophen **OR** acetaminophen, polyethylene glycol, promethazine **OR** ondansetron    Review of Systems  Pertinent items are noted in HPI. REVIEW OF SYSTEMS:         · Constitutional: Denies fever, sweats, weight loss     · Eyes: No visual changes or diplopia. No scleral icterus. · ENT: No Headaches, hearing loss or vertigo. No mouth sores or sore throat. · Cardiovascular: No chest pain, dyspnea on exertion, palpitations or loss of consciousness. · Respiratory: No cough or wheezing, no sputum production. No hemoptysis. .    · Gastrointestinal: No abdominal pain, appetite loss, blood in stools. No change in bowel habits. · Genitourinary: No dysuria, trouble voiding, or hematuria. · Musculoskeletal:  Generalized weakness. No joint complaints. · Integumentary: No rash or pruritis. · Neurological: No headache, diplopia. No change in gait, balance, or coordination. No paresthesias. · Endocrine: No temperature intolerance. No excessive thirst, fluid intake, or urination. · Hematologic/Lymphatic: No abnormal bruising or ecchymoses, blood clots or swollen lymph nodes. · Allergic/Immunologic: No nasal congestion or hives. ·     Objective:     Patient Vitals for the past 8 hrs:   BP Temp Temp src Pulse Resp SpO2   08/02/20 0637 (!) 151/90 97.6 °F (36.4 °C) Oral 59 16 95 %   08/02/20 0314 126/67 97.9 °F (36.6 °C) Oral 65 16 96 %     I/O last 3 completed shifts:   In: 36 [P.O.:820]  Out: -   No intake/output data recorded. BP (!) 151/90   Pulse 59   Temp 97.6 °F (36.4 °C) (Oral)   Resp 16   Ht 6' 3\" (1.905 m)   Wt 230 lb 6.1 oz (104.5 kg)   SpO2 95%   BMI 28.80 kg/m²     General Appearance:    Alert, cooperative, no distress, appears stated age   Head:    Normocephalic, without obvious abnormality, atraumatic   Eyes:    PERRL, conjunctiva/corneas clear, EOM's intact, fundi     benign, both eyes        Ears:    Normal TM's and external ear canals, both ears   Nose:   Nares normal, septum midline, mucosa normal, no drainage    or sinus tenderness   Throat:   Lips, mucosa, and tongue normal; teeth and gums normal   Neck:   Supple, symmetrical, trachea midline, no adenopathy;        thyroid:  No enlargement/tenderness/nodules; no carotid    bruit or JVD   Back:     Symmetric, no curvature, ROM normal, no CVA tenderness   Lungs:     Clear to auscultation bilaterally, respirations unlabored   Chest wall:    No tenderness or deformity   Heart:    Regular rate and rhythm, S1 and S2 normal, no murmur, rub   or gallop   Abdomen:     Soft, non-tender, bowel sounds active all four quadrants,     no masses, no organomegaly           Extremities:   Extremities normal, atraumatic, no cyanosis or edema   Pulses:   2+ and symmetric all extremities   Skin:   Skin color, texture, turgor normal, no rashes or lesions   Lymph nodes:   Cervical, supraclavicular, and axillary nodes normal   Neurologic:   CNII-XII intact. Normal strength, sensation and reflexes       throughout         Data Review  CBC:   Lab Results   Component Value Date    WBC 16.3 08/02/2020    RBC 5.22 08/02/2020       Assessment:     Active Problems:    Brain mass  Resolved Problems:    * No resolved hospital problems. *      Plan:     1. Recurrent DVT/PE. Definitely needs lifetime anticoagulation. I agree with heparin up until the time of surgery. Would start anticoagulation as soon as possible after surgery.  He has had a [place] filter in for several years--could consider consulting Vascular Surgery to assess its efficacy this far out. Could give DVT prophylaxis or pneumoboots immediately after surgery--defer to neurosurgery. Hypercoag workup in [place] reportedly negative.         Electronically signed by America Razo MD on 8/2/2020 at 10:48 AM

## 2020-08-02 NOTE — PROGRESS NOTES
Pt alert and oriented x4, VSS, IV has heparin gtt infusing in right arm. Anti-xa elevated @ 1.7. Stopped for one hour per order and decreased by 3 units/kg/hr. Neuro checks are WDL with the exception of left hand  slightly weaker than right side. Pt states he gets weak after standing up for about 20 minutes. Bed alarm on, bedside table and call light in reach.

## 2020-08-03 LAB
ANTI-XA UNFRAC HEPARIN: 0.57 IU/ML (ref 0.3–0.7)
ANTI-XA UNFRAC HEPARIN: 0.6 IU/ML (ref 0.3–0.7)
HCT VFR BLD CALC: 46 % (ref 40.5–52.5)
HEMOGLOBIN: 16.1 G/DL (ref 13.5–17.5)
MCH RBC QN AUTO: 31.5 PG (ref 26–34)
MCHC RBC AUTO-ENTMCNC: 34.9 G/DL (ref 31–36)
MCV RBC AUTO: 90.3 FL (ref 80–100)
PDW BLD-RTO: 12.5 % (ref 12.4–15.4)
PLATELET # BLD: 199 K/UL (ref 135–450)
PMV BLD AUTO: 9.1 FL (ref 5–10.5)
RBC # BLD: 5.09 M/UL (ref 4.2–5.9)
WBC # BLD: 13.1 K/UL (ref 4–11)

## 2020-08-03 PROCEDURE — 6360000002 HC RX W HCPCS: Performed by: NURSE PRACTITIONER

## 2020-08-03 PROCEDURE — 2580000003 HC RX 258: Performed by: INTERNAL MEDICINE

## 2020-08-03 PROCEDURE — 6370000000 HC RX 637 (ALT 250 FOR IP): Performed by: NURSE PRACTITIONER

## 2020-08-03 PROCEDURE — 6360000002 HC RX W HCPCS: Performed by: INTERNAL MEDICINE

## 2020-08-03 PROCEDURE — 85520 HEPARIN ASSAY: CPT

## 2020-08-03 PROCEDURE — 36415 COLL VENOUS BLD VENIPUNCTURE: CPT

## 2020-08-03 PROCEDURE — 2060000000 HC ICU INTERMEDIATE R&B

## 2020-08-03 PROCEDURE — 6370000000 HC RX 637 (ALT 250 FOR IP): Performed by: INTERNAL MEDICINE

## 2020-08-03 PROCEDURE — 85027 COMPLETE CBC AUTOMATED: CPT

## 2020-08-03 RX ORDER — SODIUM CHLORIDE 9 MG/ML
INJECTION, SOLUTION INTRAVENOUS CONTINUOUS
Status: DISCONTINUED | OUTPATIENT
Start: 2020-08-04 | End: 2020-08-04

## 2020-08-03 RX ADMIN — DEXAMETHASONE SODIUM PHOSPHATE 4 MG: 4 INJECTION, SOLUTION INTRAMUSCULAR; INTRAVENOUS at 19:49

## 2020-08-03 RX ADMIN — Medication 10 ML: at 21:33

## 2020-08-03 RX ADMIN — LEVETIRACETAM 500 MG: 500 TABLET, FILM COATED ORAL at 08:17

## 2020-08-03 RX ADMIN — ACETAMINOPHEN 650 MG: 325 TABLET ORAL at 19:48

## 2020-08-03 RX ADMIN — HEPARIN SODIUM 10 UNITS/KG/HR: 10000 INJECTION, SOLUTION INTRAVENOUS at 08:57

## 2020-08-03 RX ADMIN — DEXAMETHASONE SODIUM PHOSPHATE 4 MG: 4 INJECTION, SOLUTION INTRAMUSCULAR; INTRAVENOUS at 14:22

## 2020-08-03 RX ADMIN — Medication 10 ML: at 08:17

## 2020-08-03 RX ADMIN — DEXAMETHASONE SODIUM PHOSPHATE 4 MG: 4 INJECTION, SOLUTION INTRAMUSCULAR; INTRAVENOUS at 03:23

## 2020-08-03 RX ADMIN — LEVETIRACETAM 500 MG: 500 TABLET, FILM COATED ORAL at 21:05

## 2020-08-03 RX ADMIN — DEXAMETHASONE SODIUM PHOSPHATE 4 MG: 4 INJECTION, SOLUTION INTRAMUSCULAR; INTRAVENOUS at 08:17

## 2020-08-03 RX ADMIN — PANTOPRAZOLE SODIUM 40 MG: 40 TABLET, DELAYED RELEASE ORAL at 06:24

## 2020-08-03 ASSESSMENT — PAIN DESCRIPTION - DESCRIPTORS: DESCRIPTORS: ACHING

## 2020-08-03 ASSESSMENT — ENCOUNTER SYMPTOMS
EYES NEGATIVE: 1
GASTROINTESTINAL NEGATIVE: 1
RESPIRATORY NEGATIVE: 1
ALLERGIC/IMMUNOLOGIC NEGATIVE: 1

## 2020-08-03 ASSESSMENT — PAIN DESCRIPTION - ONSET: ONSET: ON-GOING

## 2020-08-03 ASSESSMENT — PAIN SCALES - GENERAL
PAINLEVEL_OUTOF10: 3
PAINLEVEL_OUTOF10: 0
PAINLEVEL_OUTOF10: 0

## 2020-08-03 ASSESSMENT — PAIN DESCRIPTION - LOCATION: LOCATION: KNEE

## 2020-08-03 ASSESSMENT — PAIN DESCRIPTION - PROGRESSION: CLINICAL_PROGRESSION: NOT CHANGED

## 2020-08-03 ASSESSMENT — PAIN - FUNCTIONAL ASSESSMENT: PAIN_FUNCTIONAL_ASSESSMENT: ACTIVITIES ARE NOT PREVENTED

## 2020-08-03 ASSESSMENT — PAIN DESCRIPTION - FREQUENCY: FREQUENCY: CONTINUOUS

## 2020-08-03 ASSESSMENT — PAIN DESCRIPTION - ORIENTATION: ORIENTATION: RIGHT;LEFT

## 2020-08-03 ASSESSMENT — PAIN DESCRIPTION - PAIN TYPE: TYPE: ACUTE PAIN

## 2020-08-03 NOTE — PLAN OF CARE
Problem: Activity:  Goal: Ability to tolerate increased activity will improve  Description: Ability to tolerate increased activity will improve  Outcome: Ongoing   Patient ambulating in room and up to bathroom with stand-by assist, tolerating well. Problem: Falls - Risk of:  Goal: Will remain free from falls  Description: Will remain free from falls  Outcome: Ongoing   Fall risk precautions in place. Bed is locked and in lowest position. 2/4 side rails up. Call light within reach. Fall risk Bracelet in place. Frequent checks on patient. Free from falls at this time. Will continue to monitor.

## 2020-08-03 NOTE — PROGRESS NOTES
Patient is alert and oriented. Vital signs are stable at this time. Denies pain and nausea at this time. Hep gtt infusing. Patient is ambulating in room and up to bathroom, tolerating well. Patient waiting to sign surgery consent, until he decides if he will have surgery at [facility]. Will continue to monitor.

## 2020-08-03 NOTE — PROGRESS NOTES
Hospitalist Progress Note      PCP: No primary care provider on file. Date of Admission: 7/30/2020    Chief Complaint: right frontal lobe brain mass    Hospital Course: Patient is a 49-year-old male with past medical history of DVT/PE on Xarelto who presented to hospital as a transfer from outside facility for right-sided frontal lobe cystic mass. Subjective: Patient was seen and examined today. Significant other present at bedside. Denies any dizziness, lightheadedness. Denies any nausea, vomiting. No acute event reported overnight. Medications:  Reviewed    Infusion Medications    [START ON 8/4/2020] sodium chloride      heparin (porcine) 10 Units/kg/hr (08/03/20 0857)     Scheduled Medications    vancomycin (VANCOCIN) IV  1,500 mg Intravenous On Call to OR    dexamethasone  4 mg Intravenous Q6H    levETIRAcetam  500 mg Oral BID    pantoprazole  40 mg Oral QAM AC    sodium chloride flush  10 mL Intravenous 2 times per day     PRN Meds: heparin (porcine), heparin (porcine), sodium chloride flush, acetaminophen **OR** acetaminophen, polyethylene glycol, promethazine **OR** ondansetron      Intake/Output Summary (Last 24 hours) at 8/3/2020 1519  Last data filed at 8/3/2020 0857  Gross per 24 hour   Intake 670 ml   Output --   Net 670 ml       Physical Exam Performed:    /84   Pulse 64   Temp 98.1 °F (36.7 °C) (Oral)   Resp 16   Ht 6' 3\" (1.905 m)   Wt 230 lb 6.1 oz (104.5 kg)   SpO2 95%   BMI 28.80 kg/m²     General appearance: No apparent distress, appears stated age and cooperative. HEENT: Pupils equal, round, and reactive to light. Conjunctivae/corneas clear. Neck: Supple, with full range of motion. No jugular venous distention. Trachea midline. Respiratory:  Normal respiratory effort. Clear to auscultation, bilaterally without Rales/Wheezes/Rhonchi. Cardiovascular: Regular rate and rhythm with normal S1/S2 without murmurs, rubs or gallops.   Abdomen: Soft, non-tender, non-distended with normal bowel sounds. Musculoskeletal: No clubbing, cyanosis or edema bilaterally. Full range of motion without deformity. Skin: Skin color, texture, turgor normal.  No rashes or lesions. Neurologic:  Neurovascularly intact without any focal sensory/motor deficits. Cranial nerves: II-XII intact, grossly non-focal.  Psychiatric: Alert and oriented, thought content appropriate, normal insight  Capillary Refill: Brisk,< 3 seconds   Peripheral Pulses: +2 palpable, equal bilaterally       Labs:   Recent Labs     08/01/20 2024 08/02/20 0430 08/03/20  0555   WBC 16.4* 16.3* 13.1*   HGB 17.1 16.4 16.1   HCT 49.0 46.8 46.0    217 199     Recent Labs     08/02/20  0430      K 4.0      CO2 22   BUN 21*   CREATININE 1.2   CALCIUM 9.2     No results for input(s): AST, ALT, BILIDIR, BILITOT, ALKPHOS in the last 72 hours. Recent Labs     08/01/20 2024 08/02/20  0430   INR 1.18* 1.20*     No results for input(s): Freida Maribell in the last 72 hours. Urinalysis:    No results found for: Sissy Blazer, BACTERIA, RBCUA, BLOODU, SPECGRAV, [place] [place] 994    Radiology:  MRI BRAIN W WO CONTRAST   Final Result   1. Partially cystic, partially enhancing intra-axial mass lesion in the medial aspect of the right frontal lobe measuring up to 3.4 cm in long axis diameter as described. Surrounding areas of nodular satellite enhancement are also noted in the right    frontoparietal region, including some nodular enhancement involving the inferior body of the corpus callosum. 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 Metastatic lesion not entirely excluded though considered much less likely. 2. Small focus of abnormal FLAIR hyperintensity in the posterior and medial aspect of the left thalamus, without associated diffusion restriction or pathologic enhancement. This finding is nonspecific. It could represent an additional focus of    nonenhancing neoplasm.  Late subacute to chronic infarct an additional consideration. CT CHEST ABDOMEN PELVIS W CONTRAST   Final Result      Unremarkable CT chest abdomen pelvis. No signs of primary or metastatic tumor. No lymphadenopathy. 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   Neurosurgery for tumor resection likely tomorrow  Michael Schroeder MD

## 2020-08-03 NOTE — PROGRESS NOTES
NEUROSURGERY     Carmen Colmenares   [id number]   1975   8/3/2020    Interval History:  Hospital Day #4    Subjective: Patient tearful this morning, had lengthy discussion with Dr. Jazmín Burt regarding need for tumor resection. Objective:  /86   Pulse 52   Temp 98 °F (36.7 °C) (Oral)   Resp 16   Ht 6' 3\" (1.905 m)   Wt 230 lb 6.1 oz (104.5 kg)   SpO2 93%   BMI 28.80 kg/m²     Labs:  Recent Labs     08/02/20  0430         CO2 22   BUN 21*   CREATININE 1.2   GLUCOSE 146*     Recent Labs     08/01/20 2024 08/02/20  0430 08/03/20  0555   WBC 16.4* 16.3* 13.1*   RBC 5.48 5.22 5.09   INR 1.18* 1.20*  --        Neurologic Exam:  GCS:  4 - Opens eyes on own  5 - Alert and oriented  6 - Follows simple motor commands    Mental Status: Awake, alert, oriented x 4, speech clear and appropriate  Language: No aphasia or dysarthria noted  Sensation: Intact to all extremities to light touch  Coordination: Intact    Cranial Nerves:  II: Visual acuity not tested, visual fields intact, denies new visual changes / diplopia  III, IV, VI: PERRL, 3 mm bilaterally, EOMI, no nystagmus noted  V: Facial sensation intact bilaterally to touch  VII: Face symmetric  VIII: Hearing intact bilaterally to spoken voice  IX: Palate movement equal bilaterally  XI: Shoulder shrug equal bilaterally  XII: Tongue midline    Musculoskeletal:   Gait: Not tested   Tone: normal   Motor strength:    Right  Left    Right  Left    Deltoid  5 5  Hip Flex  5 5   Biceps  5 5  Knee Extensors  5 5   Triceps  5 5  Knee Flexors  5 5   Wrist Ext  5 5  Ankle Dorsiflex. 5 5   Wrist Flex  5 4  Ankle Plantarflex. 5 5   Handgrip  5 4  Ext Otis Longus  5 5   Thumb Ext  5 4           Assessment   53year old male 53year old male experiencing dizziness and leg heaviness who was found to have partially cystic, partially enhancing intra-axial mass lesion in the medial aspect of the right frontal lobe suspicious for high grade glioma.      Plan:

## 2020-08-03 NOTE — PROGRESS NOTES
Oncology Hematology [facility]  Progress Note    Subjective:         Review of Systems:     Review of Systems   Constitutional: Negative. HENT: Negative. Eyes: Negative. Respiratory: Negative. Cardiovascular: Negative. Gastrointestinal: Negative. Endocrine: Negative. Genitourinary: Negative. Musculoskeletal: Negative. Skin: Negative. Allergic/Immunologic: Negative. Neurological: Negative. Hematological: Negative. Objective:     Medications    Current Facility-Administered Medications: heparin (porcine) injection 8,350 Units, 80 Units/kg, Intravenous, PRN  heparin (porcine) injection 4,200 Units, 40 Units/kg, Intravenous, PRN  heparin 25,000 units in dextrose 5% 250 mL infusion, 12 Units/kg/hr, Intravenous, Continuous  [COMPLETED] dexamethasone (DECADRON) injection 10 mg, 10 mg, Intravenous, Once **FOLLOWED BY** dexamethasone (DECADRON) injection 4 mg, 4 mg, Intravenous, Q6H  levETIRAcetam (KEPPRA) tablet 500 mg, 500 mg, Oral, BID  pantoprazole (PROTONIX) tablet 40 mg, 40 mg, Oral, QAM AC  sodium chloride flush 0.9 % injection 10 mL, 10 mL, Intravenous, 2 times per day  sodium chloride flush 0.9 % injection 10 mL, 10 mL, Intravenous, PRN  acetaminophen (TYLENOL) tablet 650 mg, 650 mg, Oral, Q6H PRN **OR** acetaminophen (TYLENOL) suppository 650 mg, 650 mg, Rectal, Q6H PRN  polyethylene glycol (GLYCOLAX) packet 17 g, 17 g, Oral, Daily PRN  promethazine (PHENERGAN) tablet 12.5 mg, 12.5 mg, Oral, Q6H PRN **OR** ondansetron (ZOFRAN) injection 4 mg, 4 mg, Intravenous, Q6H PRN    Allergies  Allergies   Allergen Reactions    Clindamycin/Lincomycin     Pcn [Penicillins]        Physical Exam  VITALS:  /83   Pulse 53   Temp 97.7 °F (36.5 °C) (Oral)   Resp 16   Ht 6' 3\" (1.905 m)   Wt 230 lb 6.1 oz (104.5 kg)   SpO2 94%   BMI 28.80 kg/m²   TEMPERATURE:  Current - Temp: 97.7 °F (36.5 °C);  Max - Temp  Av.1 °F (36.7 °C)  Min: 97.7 °F (36.5 °C)  Max: 98.5 °F (36.9 acute abnormality. There is no acute abnormality of the thoracic aorta. Lungs/pleura: There is mild bibasilar dependent atelectasis. No focal consolidation or pulmonary edema. No evidence of pleural effusion or pneumothorax. Upper Abdomen: Limited images of the upper abdomen are unremarkable. Incidental note is made of an IVC filter. Soft Tissues/Bones: No acute bone or soft tissue abnormality. No evidence of pulmonary embolism or acute pulmonary abnormality. Cta Head Neck W Contrast    Result Date: 7/30/2020  EXAMINATION: CTA OF THE HEAD AND NECK WITH CONTRAST 7/30/2020 3:58 pm: TECHNIQUE: CTA of the head and neck was performed with the administration of intravenous contrast. Multiplanar reformatted images are provided for review. MIP images are provided for review. Stenosis of the internal carotid arteries measured using NASCET criteria. Dose modulation, iterative reconstruction, and/or weight based adjustment of the mA/kV was utilized to reduce the radiation dose to as low as reasonably achievable. COMPARISON: CT head 07/30/2020 HISTORY: ORDERING SYSTEM PROVIDED HISTORY: dizziness, room spinning sensation TECHNOLOGIST PROVIDED HISTORY: Reason for exam:->dizziness, room spinning sensation Reason for Exam: DIZZINESS FINDINGS: CTA NECK: AORTIC ARCH/ARCH VESSELS: No dissection or arterial injury. No significant stenosis of the brachiocephalic or subclavian arteries. CAROTID ARTERIES: No dissection, arterial injury, or hemodynamically significant stenosis by NASCET criteria. VERTEBRAL ARTERIES: No dissection, arterial injury, or significant stenosis. SOFT TISSUES: The lung apices are clear. No cervical or superior mediastinal lymphadenopathy. The larynx and pharynx are unremarkable. No acute abnormality of the salivary and thyroid glands. BONES: Mild multilevel degenerate changes cervical spine.  CTA HEAD: ANTERIOR CIRCULATION: No significant stenosis of the intracranial internal carotid, anterior cerebral, or middle cerebral arteries. No aneurysm. POSTERIOR CIRCULATION: No significant stenosis of the vertebral, basilar, or posterior cerebral arteries. No aneurysm. OTHER: No dural venous sinus thrombosis on this non-dedicated study. BRAIN: Heterogeneous mass involving the right frontal lobe again identified 3.8 cm in AP dimension. This demonstrates a central area of hypoattenuating component which may represent cystic or central necrotic component and soft tissue point. There appears to be an adjacent satellite lesion measuring 5 mm in size. There is a formation of the corpus callosum. There is suspicion for a small enhancing component of the mass extending along the corpus callosum midline     Negative for large vessel occlusion or hemodynamic stenosis. Right frontal intraparenchymal mass again demonstrated, which appears to possibly extend across the midline minutes centrally necrotic or cystic component. Recommend further characterization with MRI performed without and with contrast.  Please note that a high-grade glioma would be considered in differential..     Ct Chest Abdomen Pelvis W Contrast    Result Date: 7/31/2020  CT chest, abdomen and pelvis HISTORY: Brain mass; evaluate for primary tumor Scans from thoracic inlet to diaphragm and subsequently from diaphragm to pubic symphysis during infusion of 80 mL [id number]. Oral contrast also administered. Up-to-date CT equipment with radiation dose reduction techniques. Chest- Comparison with yesterday's CT study. Trachea and mainstem bronchi patent. Patchy airspace disease in the left lower lobe shows mild interval progression. Right lung clear with the exception of minimal subpleural inflammatory change in the middle lobe. Normal sized superior mediastinal lymph node, by short axis, measures 15 x 10 mm, series 601 image 31. No hilar, axillary or supraclavicular lymphadenopathy. Thyroid gland normal size.  Thoracic aorta normal size without intimal flap. Central pulmonary arteries are patent. No suspicious osseous lesion. Abdomen and pelvis- No prior studies for review. Liver and spleen enhance uniformly. Gallbladder without calcific stones. Pancreas, adrenals and kidneys normal with tiny endophytic cyst of the right kidney. Abdominal aorta normal size. Patency of portal vein, portal vein branches, SMV, SMA and celiac axis. Inferior vena caval filter lies cephalad to the renal veins. Esophagus unremarkable. Large and small bowel loops of normal caliber without wall thickening or pneumatosis. Evaluation of the rectum is compromised due to contracted status, which accentuates wall thickness . No signs of acute appendicitis or diverticulitis. No pneumoperitoneum or ascites. No abdominal or pelvic lymphadenopathy. Urinary bladder normal. Prostate gland mildly enlarged. No suspicious osseous lesion     Unremarkable CT chest abdomen pelvis. No signs of primary or metastatic tumor. No lymphadenopathy. Pathology  Pend    Problem List  Patient Active Problem List   Diagnosis    Brain mass       Assessment and Plan:     R Frontal Brain Mass - Await neurosurg recommendations for resection. Recurrent DVT/PE - Hx of Given. He needs lifetime anticoagulation. I agree with heparin gtt up until the time of surgery. Would start anticoagulation as soon as possible after surgery. He has had a Heath filter in for several years--could consider consulting Vascular Surgery to assess its efficacy this far out. Could give DVT prophylaxis or pneumoboots immediately after surgery--defer to neurosurgery. Hypercoag workup in [place] reportedly negative.       Lexx Parekh MD  8/3/2020

## 2020-08-03 NOTE — PROGRESS NOTES
Pt alert and oriented x4, VSS, heparin gtt infusing in right AC IV. Pt is therapeutic on hep gtt. Neuro checks are unchanged. Pt slightly weaker on left side. Pt ambulated to bathroom with steady gait. Bed alarm on, bedside table and call light in reach.

## 2020-08-03 NOTE — CARE COORDINATION
Stopped by to see patient. He was sleeping and girlfriend is at side. Patient is up with stand-by assist and no DME currently. 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 Will continue to follow.      Electronically signed by Sally Ramos RN on 8/3/2020 at 4:15 PM  [phone]

## 2020-08-04 VITALS
RESPIRATION RATE: 14 BRPM | HEART RATE: 59 BPM | TEMPERATURE: 98.1 F | HEIGHT: 75 IN | SYSTOLIC BLOOD PRESSURE: 138 MMHG | WEIGHT: 230.38 LBS | DIASTOLIC BLOOD PRESSURE: 84 MMHG | OXYGEN SATURATION: 93 % | BODY MASS INDEX: 28.65 KG/M2

## 2020-08-04 LAB
ANTI-XA UNFRAC HEPARIN: 0.34 IU/ML (ref 0.3–0.7)
APTT: 36 SEC (ref 24.2–36.2)
BASOPHILS ABSOLUTE: 0 K/UL (ref 0–0.2)
BASOPHILS RELATIVE PERCENT: 0 %
EOSINOPHILS ABSOLUTE: 0 K/UL (ref 0–0.6)
EOSINOPHILS RELATIVE PERCENT: 0 %
HCT VFR BLD CALC: 48.1 % (ref 40.5–52.5)
HEMOGLOBIN: 16.7 G/DL (ref 13.5–17.5)
INR BLD: 1.02 (ref 0.86–1.14)
LYMPHOCYTES ABSOLUTE: 0.5 K/UL (ref 1–5.1)
LYMPHOCYTES RELATIVE PERCENT: 4 %
MCH RBC QN AUTO: 31.3 PG (ref 26–34)
MCHC RBC AUTO-ENTMCNC: 34.7 G/DL (ref 31–36)
MCV RBC AUTO: 90.1 FL (ref 80–100)
MONOCYTES ABSOLUTE: 0.3 K/UL (ref 0–1.3)
MONOCYTES RELATIVE PERCENT: 2 %
NEUTROPHILS ABSOLUTE: 11.8 K/UL (ref 1.7–7.7)
NEUTROPHILS RELATIVE PERCENT: 94 %
OVALOCYTES: ABNORMAL
PDW BLD-RTO: 12.6 % (ref 12.4–15.4)
PLATELET # BLD: 201 K/UL (ref 135–450)
PMV BLD AUTO: 9.5 FL (ref 5–10.5)
PROTHROMBIN TIME: 11.8 SEC (ref 10–13.2)
RBC # BLD: 5.34 M/UL (ref 4.2–5.9)
WBC # BLD: 12.6 K/UL (ref 4–11)

## 2020-08-04 PROCEDURE — 2580000003 HC RX 258: Performed by: INTERNAL MEDICINE

## 2020-08-04 PROCEDURE — 85610 PROTHROMBIN TIME: CPT

## 2020-08-04 PROCEDURE — 85025 COMPLETE CBC W/AUTO DIFF WBC: CPT

## 2020-08-04 PROCEDURE — 85520 HEPARIN ASSAY: CPT

## 2020-08-04 PROCEDURE — 6370000000 HC RX 637 (ALT 250 FOR IP): Performed by: NURSE PRACTITIONER

## 2020-08-04 PROCEDURE — 6360000002 HC RX W HCPCS: Performed by: NURSE PRACTITIONER

## 2020-08-04 PROCEDURE — 36415 COLL VENOUS BLD VENIPUNCTURE: CPT

## 2020-08-04 PROCEDURE — 85730 THROMBOPLASTIN TIME PARTIAL: CPT

## 2020-08-04 RX ORDER — DEXAMETHASONE 1 MG
4 TABLET ORAL EVERY 6 HOURS
Qty: 112 TABLET | Refills: 0 | Status: SHIPPED | OUTPATIENT
Start: 2020-08-04 | End: 2020-08-11

## 2020-08-04 RX ORDER — DEXAMETHASONE 1 MG
4 TABLET ORAL EVERY 6 HOURS
Qty: 80 TABLET | Refills: 0 | Status: SHIPPED | OUTPATIENT
Start: 2020-08-04 | End: 2020-08-04 | Stop reason: SDUPTHER

## 2020-08-04 RX ORDER — LEVETIRACETAM 500 MG/1
500 TABLET ORAL 2 TIMES DAILY
Qty: 14 TABLET | Refills: 0 | Status: SHIPPED | OUTPATIENT
Start: 2020-08-04 | End: 2020-08-11

## 2020-08-04 RX ORDER — LEVETIRACETAM 500 MG/1
500 TABLET ORAL 2 TIMES DAILY
Qty: 10 TABLET | Refills: 0 | Status: SHIPPED | OUTPATIENT
Start: 2020-08-04 | End: 2020-08-04

## 2020-08-04 RX ORDER — PANTOPRAZOLE SODIUM 40 MG/1
40 TABLET, DELAYED RELEASE ORAL
Qty: 7 TABLET | Refills: 0 | Status: SHIPPED | OUTPATIENT
Start: 2020-08-05 | End: 2020-08-10

## 2020-08-04 RX ORDER — PANTOPRAZOLE SODIUM 40 MG/1
40 TABLET, DELAYED RELEASE ORAL
Qty: 5 TABLET | Refills: 0 | Status: SHIPPED | OUTPATIENT
Start: 2020-08-05 | End: 2020-08-04

## 2020-08-04 RX ADMIN — ENOXAPARIN SODIUM 100 MG: 100 INJECTION SUBCUTANEOUS at 08:55

## 2020-08-04 RX ADMIN — Medication 10 ML: at 08:55

## 2020-08-04 RX ADMIN — PANTOPRAZOLE SODIUM 40 MG: 40 TABLET, DELAYED RELEASE ORAL at 07:09

## 2020-08-04 RX ADMIN — DEXAMETHASONE SODIUM PHOSPHATE 4 MG: 4 INJECTION, SOLUTION INTRAMUSCULAR; INTRAVENOUS at 08:55

## 2020-08-04 RX ADMIN — DEXAMETHASONE SODIUM PHOSPHATE 4 MG: 4 INJECTION, SOLUTION INTRAMUSCULAR; INTRAVENOUS at 02:54

## 2020-08-04 RX ADMIN — LEVETIRACETAM 500 MG: 500 TABLET, FILM COATED ORAL at 08:55

## 2020-08-04 ASSESSMENT — PAIN SCALES - GENERAL: PAINLEVEL_OUTOF10: 0

## 2020-08-04 NOTE — PLAN OF CARE
Problem: Pain:  Goal: Control of acute pain  Description: Control of acute pain  Outcome: Ongoing   Pt denies pain at this time. Will continue to monitor. Problem: Safety:  Goal: Free from accidental physical injury  Description: Free from accidental physical injury  Outcome: Ongoing   Fall risk precautions in place. Bed is locked and in lowest position. 2/4 side rails up. Fall risk bracelet in place. Frequent checks on patient. Call light within reach. Free from falls at this time. PT up w/ SBA. Will continue to monitor.

## 2020-08-04 NOTE — CARE COORDINATION
Case Management Assessment            Discharge Note                    Date / Time of Note: 8/4/2020 10:45 AM                  Discharge Note Completed by: Radames Florence RN and I spoke with patient at bedside regarding d/c. Patient has no needs from  and girlfriend will drive him home. Prescriptions will be sent to pharmacy to be filled through meds-beds. Patient Name: Bisi Freeman   YOB: 1975  Diagnosis: Brain mass [G93.89]   Date / Time: 7/30/2020 10:15 PM    Current PCP: No primary care provider on file. Clinic patient: No    Hospitalization in the last 30 days: No    Advance Directives:  Code Status: Full Code  [place] DNR form completed and on chart: No    Financial:  Payor: Claus Ladd / Plan: [address] [place] [address] / Product Type: *No Product type* /      Pharmacy:  No Pharmacies [id number] [facility] medications?: Potential Assistance Purchasing Medications: No  Assistance provided by Case Management: None at this time    Does patient want to participate in local refill/ meds to beds program?: No    Meds To [employer] Rules:  1. Can ONLY be done Monday- Friday between 8:30am-5pm  2. Prescription(s) must be in pharmacy by 3pm to be filled same day  3. Copy of patient's insurance/ prescription drug card and patient face sheet must be sent along with the prescription(s)  4. Cost of Rx cannot be added to hospital bill. If financial assistance is needed, please contact unit  or ;  or  CANNOT provide pharmacy voucher for patients co-pays  5.  Patients can then  the prescription on their way out of the hospital at discharge, or pharmacy can deliver to the bedside if staff is available. (payment due at time of pick-up or delivery - cash, check, or card accepted)     Able to afford home medications/ co-pay costs: Yes    ADLS:  Current PT AM-PAC Score:   /24  Current OT AM-PAC Score:   /24      DISCHARGE Disposition: Home-

## 2020-08-04 NOTE — ADT AUTH CERT
Utilization Reviews         Neurology [address] Day 5 (8/3/2020) by Clem Barnhart RN         Review Status  Review Entered    Completed  8/4/2020 10:16        Criteria Review       Care Day: 5 Care Date: 8/3/2020 Level of Care: Intermediate Care    Guideline Day 2    Level Of Care    (X) Floor    8/4/2020 10:16 AM EDT by Dayami Soni      neuro    Clinical Status    (X) * No ICU or intermediate care needs    Interventions    (X) Inpatient interventions continue    8/4/2020 10:16 AM EDT by Dayami Soni      yes    ( ) Transition to oral routes    8/4/2020 10:16 AM EDT by Dayami Soni      np- still IV steroids, IV heparin gtt    * Milestone    Additional Notes    8/3     Oncology    97.3 (36.3)   16   62   151/84Abnormal    Remains on IV heparin gtt, IV steroids q 6          Neurosurgery-    A/P: 54 yo man with newly identified right heterogeneously-enhancing frontal mass       Awake, alert, oriented x 4, speech clear and appropriate    Language: No aphasia or dysarthria noted        Motor strength:      Right Left     Right Left    Deltoid 5 5   Hip Flex 5 5    Biceps 5 5   Knee Extensors 5 5    Triceps 5 5   Knee Flexors 5 5    Wrist Ext 5 5   Ankle Dorsiflex. 5 5    Wrist Flex 5 4   Ankle Plantarflex. 5 5    Handgrip 5 4   Ext Otis Longus 5 5    Thumb Ext 5 4             -Neuro stable    -HOB elevated    -Frequent neuro checks    -Decadron 4mg q6 hrs    -PPI/SSI    -Keppra for seizure ppx    -Continue heparin gtt    -Plan to OR tomorrow for right frontoparietal craniotomy and resection of tumor    -NPO overnight    -Patient may elected to proceed with surgery at [facility] or [facility].           IV/Meds: heparin iv gtt, IV decadron 4mg q 6, protonix 40 po qd, keppra 500mg po bid,650 po q 6 prn(x 1),        Neurology [address] Day 3 (8/1/2020) by Clem Barnhart, ALEC         Review Status  Review Entered    Completed  8/3/2020 12:04        Criteria Review       Care Day: 3 Care Date: 8/1/2020 Level of Care: Intermediate Care    Guideline Day 2    Level Of Care    (X) Floor    8/3/2020 12:04 PM EDT by Connie Mcgovern      neuro/intermediate    Clinical Status    ( ) * No ICU or intermediate care needs    8/3/2020 12:04 PM EDT by Connie Mcgovern      yes    Interventions    (X) Inpatient interventions continue    8/3/2020 12:04 PM EDT by Connie Mcgovern      yes- neurosurgery, oncology    ( ) Transition to oral routes    8/3/2020 12:04 PM EDT by Connie Mcgovern      no    * Milestone    Additional Notes    8/1    99.4  18   77   142/87Abnormal    Neuro/monitor    IV heparin drip initiated    For OR likely Monday       MRI brain-    1. Partially cystic, partially enhancing intra-axial mass lesion in the medial aspect of the right frontal lobe measuring up to 3.4 cm in long axis diameter as described. Surrounding areas of nodular satellite enhancement are also noted in the right    frontoparietal region, including some nodular enhancement involving the inferior body of the corpus callosum. 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 Metastatic lesion not entirely excluded though considered much less likely. 2. Small focus of abnormal FLAIR hyperintensity in the posterior and medial aspect of the left thalamus, without associated diffusion restriction or pathologic enhancement. This finding is nonspecific. It could represent an additional focus of    nonenhancing neoplasm. Late subacute to chronic infarct an additional consideration             PCP-    Neurologic:  without any focal sensory/motor deficits.  grossly non-focal.    Psychiatric: Alert and oriented, Normal mood    who presented to hospital as a transfer from outside facility for right-sided frontal lobe cystic mass.              Right frontal lobe cystic brain mass    Seen by neurosurgery    On Decadron    No focal neurology    MRI done report pending    CT chest abdomen pelvis was negative    On [facility] for seizure prophylaxis           Previous recurrent DVT and PE    Was on Xarelto which was discontinued and started on Lovenox DVT prophylaxis    Seen by hematology who recommended starting him on heparin depending on the bleeding risk    Will discuss with neurosurgery MRI report is still pending         Discussed with neurosurgery    Okay to start on IV heparin drip    Can be discontinued Monday Monday morning    Given the location of the lesion surgery needs to be seen sooner rather than later       Oncology-    A/P:    1. Brain lesion--MRI pending. Based on CT scans, this appears to be a primary brain issue. Neurosurgery following. 2. Recurrent DVT/PE. Previous hypercoag workup reportedly negative. On xarelto. Pranay Nutley Heath filter placed in 2006/2007.  Still in. Obviously the least amount of time off anticoagulation the better.  Consider starting heparin now if safe depending on bleeding risk up until surgery. If efficacy of [place] needs to be assessed, consider consulting Vascular Surgery.        IV/Meds: heparin bolus x 1, heparin iv drip initiated    Decadeon 4mg iv q 6, keppra 500mg po bid, protonix 40 po qd,     Fioricet 2tab po x 2, tylenol 650 po q 6 prn(x 2),

## 2020-08-04 NOTE — DISCHARGE SUMMARY
Hospital Medicine Discharge Summary    Patient ID: Bisi Freeman      Patient's PCP: No primary care provider on file. Admit Date: 7/30/2020     Discharge Date:      Admitting Physician: Tyrone Moss MD     Discharge Physician: Tyrone Moss MD     Discharge Diagnoses:  Right frontal lobe cystic brain mass  Recurrent DVT/PE on Xarelto  Leukocytosis likely reactive       Active Hospital Problems    Diagnosis Date Noted    Brain mass [G93.89] 07/30/2020       The patient was seen and examined on day of discharge and this discharge summary is in conjunction with any daily progress note from day of discharge. Condition at discharge - stable    Hospital Course: Patient is a 40-year-old male with past medical history of DVT/PE on Xarelto who presented to hospital as a transfer from outside facility for right-sided frontal lobe cystic mass. Patient seen and evaluated by neurosurgery. Patient refused surgery at this hospital.  Patient wanted to get surgery at [facility]. Patient will be discharged stable condition. Patient agrees with the plan. Patient discharged on Lovenox, Decadron, Protonix. All the questions of the patient answered. Patient discharged in stable condition patient seen and evaluated on the day of discharge. Patient informed about following up with appointments. Patient verbalized understanding for follow-up appointments. All questions of the patient answered, patient is in agreement with the discharge plan. Medical reconciliation performed. Patient will be discharged stable condition. Exam:     BP (!) 145/97   Pulse 52   Temp 98.1 °F (36.7 °C) (Oral)   Resp 14   Ht 6' 3\" (1.905 m)   Wt 230 lb 6.1 oz (104.5 kg)   SpO2 94%   BMI 28.80 kg/m²     General appearance: No apparent distress  HEENT:  Conjunctivae/corneas clear. Neck: Supple, with full range of motion. No jugular venous distention. Trachea midline.   Respiratory:  Normal respiratory effort. Clear to auscultation, bilaterally without Rales/Wheezes/Rhonchi. Cardiovascular: Regular rate and rhythm with normal S1/S2 without murmurs, rubs or gallops. Abdomen: Soft, non-tender, non-distended, normal bowel sounds. Musculoskelatal: No clubbing, cyanosis or edema bilaterally. Full range of motion without deformity. Skin: Skin color, texture, turgor normal.  No rashes or lesions. Neurologic: no focal neurologic deficits. Cranial nerves: II-XII intact, grossly non-focal.  Psychiatric: Alert and oriented, normal mood    Consults:     IP CONSULT TO HEM/ONC      Code Status:  Full Code    Activity: activity as tolerated    Labs: For convenience and continuity at follow-up the following most recent labs are provided:      CBC:    Lab Results   Component Value Date    WBC 12.6 08/04/2020    HGB 16.7 08/04/2020    HCT 48.1 08/04/2020     08/04/2020       Renal:    Lab Results   Component Value Date     08/02/2020    K 4.0 08/02/2020    K 4.0 07/31/2020     08/02/2020    CO2 22 08/02/2020    BUN 21 08/02/2020    CREATININE 1.2 08/02/2020    CALCIUM 9.2 08/02/2020       Discharge Medications:     Current Discharge Medication List           Details   enoxaparin (LOVENOX) 100 MG/ML injection Inject 1 mL into the skin 2 times daily for 7 days  Qty: 14 mL, Refills: 0      levETIRAcetam (KEPPRA) 500 MG tablet Take 1 tablet by mouth 2 times daily for 7 days  Qty: 14 tablet, Refills: 0      dexamethasone (DECADRON) 1 MG tablet Take 4 tablets by mouth every 6 hours for 7 days  Qty: 112 tablet, Refills: 0      pantoprazole (PROTONIX) 40 MG tablet Take 1 tablet by mouth every morning (before breakfast) for 5 days  Qty: 7 tablet, Refills: 0             Time Spent on discharge is more than 30 mints in the examination, evaluation, counseling and review of medications and discharge plan. Signed:    Elijah Resendiz MD   8/4/2020      Thank you No primary care provider on file.  for the opportunity to be involved in this patient's care. If you have any questions or concerns please feel free to contact me at [phone].

## 2020-08-04 NOTE — PROGRESS NOTES
Pt discharge instructions provided by RN. Pt verbalized understanding. Pt discharged with all belongings with family in personal vehicle.

## 2020-08-04 NOTE — PROGRESS NOTES
NEUROSURGERY     Sulaiman Escamilla   [id number]   1975 8/4/2020    Interval History:  Hospital Day #5    Subjective: Patient very tearful again this morning, eager to leave to drive back to [place] with his girlfriend. Objective:  /81   Pulse 54   Temp 98.2 °F (36.8 °C) (Oral)   Resp 16   Ht 6' 3\" (1.905 m)   Wt 230 lb 6.1 oz (104.5 kg)   SpO2 93%   BMI 28.80 kg/m²     Labs:  Recent Labs     08/02/20  0430         CO2 22   BUN 21*   CREATININE 1.2   GLUCOSE 146*     Recent Labs     08/01/20 2024 08/02/20  0430 08/03/20  0555 08/04/20  0517   WBC 16.4* 16.3* 13.1*  --    RBC 5.48 5.22 5.09  --    INR 1.18* 1.20*  --  1.02       Neurologic Exam:  GCS:  4 - Opens eyes on own  5 - Alert and oriented  6 - Follows simple motor commands    Mental Status: Awake, alert, oriented x 4, speech clear and appropriate  Language: No aphasia or dysarthria noted  Sensation: Intact to all extremities to light touch  Coordination: Intact    Cranial Nerves:  II: Visual acuity not tested, visual fields intact, denies new visual changes / diplopia  III, IV, VI: PERRL, 3 mm bilaterally, EOMI, no nystagmus noted  V: Facial sensation intact bilaterally to touch  VII: Face symmetric  VIII: Hearing intact bilaterally to spoken voice  IX: Palate movement equal bilaterally  XI: Shoulder shrug equal bilaterally  XII: Tongue midline    Musculoskeletal:   Gait: Not tested   Tone: normal   Motor strength:    Right  Left    Right  Left    Deltoid  5 5  Hip Flex  5 5   Biceps  5 5  Knee Extensors  5 5   Triceps  5 5  Knee Flexors  5 5   Wrist Ext  5 5  Ankle Dorsiflex. 5 5   Wrist Flex  5 5  Ankle Plantarflex.   5 5   Handgrip  5 4+  Ext Otis Longus  5 5   Thumb Ext  5 4+           Assessment   53year old male 53year old male experiencing dizziness and leg heaviness who was found to have partially cystic, partially enhancing intra-axial mass lesion in the medial aspect of the right frontal lobe suspicious for high grade glioma. Plan:        1. Patient elected to proceed with surgery at [facility], will be leaving today         2. Neurologic exams frequency: q 4 hours  3. For change in exam MUST contact neurosurgery team along with critical care or primary team  4. MRI completed. No further imaging unless there is a decline in neurologic exam  5. SCDs for DVT prophylaxis  6. Cerebral edema:  - Decadron 4 mg Q6H  - Keep HOB >30 degrees  - NO dextrose in IVF's or in IV drips  10. Seizure prophylaxis: Keppra 500mg BID  11. GI Prophylaxis: Protonix  12. Patient on heparin gtt inpatient, therapeutic Lovenox dose ordered for personal transport to [address] [place] [address] discharge today from neurosurgical standpoint to proceed with surgery at [place] [id number]  Patient was seen and examined with Dr. Julien Dalton who agrees with above assessment and plan.      Electronically signed8/4/2020 6:58 AM

## 2020-08-04 NOTE — PROGRESS NOTES
Patient alert and orientated. VSS. Pain controlled with oral tylenol. Heparin gtt infusing, patient has been therapeutic. Patient up to bathroom x1 SBA, tolerates ambulation well, voiding appropriately. Fall precautions in place. Call light within reach. Will continue to assess and monitor.

## 2020-08-07 NOTE — PROGRESS NOTES
Physician Progress Note      PATIENT:               Deandre Long  CSN #:                  [id number]  :                       1975  ADMIT DATE:       2020 10:15 PM  [facility] DATE:        2020 1:40 PM  RESPONDING  PROVIDER #:        Mark Lindsey MD          QUERY TEXT:    Pt admitted  with a Right frontal lobe cystic brain mass. Per   Neurosurgery \"suspicious for high grade glioma\". 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; Per Neurosurg \"partially cystic, partially enhancing   intra-axial mass lesion in the medial aspect of the right frontal lobe   suspicious for high grade glioma\"  Treatment: Decadron, Keppra, MRI w/ Brain lab protocol; Pt elected to have   surgery at OSH  Options provided:  -- Mass is suspected to be high grade Glioma  -- Other - I will add my own diagnosis  -- Disagree - Not applicable / Not valid  -- Disagree - Clinically unable to determine / Unknown  -- Refer to Clinical Documentation Reviewer    PROVIDER RESPONSE TEXT:    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

## 2022-01-01 NOTE — PLAN OF CARE
Problem: Pain:  Goal: Pain level will decrease  Description: Pain level will decrease  Outcome: Ongoing  Note: Patient complains of knee pain. Tylenol and warm compress given. Patient states some relief. Will continue to assess and monitor. Problem: Falls - Risk of:  Goal: Will remain free from falls  Description: Will remain free from falls  Outcome: Ongoing  Note: Patient will remain free from falls. Patient alert and orientated and uses call light appropriately. Patient up x1, tolerates ambulation well. Fall precautions in place. Bed locked and in lowest position, bed alarm on, nonskid socks on. Call light within reach. (2) cough or sneeze